# Patient Record
Sex: FEMALE | Race: OTHER | Employment: UNEMPLOYED
[De-identification: names, ages, dates, MRNs, and addresses within clinical notes are randomized per-mention and may not be internally consistent; named-entity substitution may affect disease eponyms.]

---

## 2022-05-26 ENCOUNTER — OFFICE VISIT (OUTPATIENT)
Dept: FAMILY MEDICINE CLINIC | Facility: CLINIC | Age: 62
End: 2022-05-26

## 2022-05-26 VITALS
TEMPERATURE: 98 F | WEIGHT: 174.2 LBS | RESPIRATION RATE: 18 BRPM | BODY MASS INDEX: 30.87 KG/M2 | OXYGEN SATURATION: 96 % | DIASTOLIC BLOOD PRESSURE: 92 MMHG | HEART RATE: 72 BPM | SYSTOLIC BLOOD PRESSURE: 166 MMHG | HEIGHT: 63 IN

## 2022-05-26 DIAGNOSIS — R22.1 NECK SWELLING: ICD-10-CM

## 2022-05-26 DIAGNOSIS — Z98.890 HISTORY OF THYROID SURGERY: ICD-10-CM

## 2022-05-26 DIAGNOSIS — Z76.89 ENCOUNTER TO ESTABLISH CARE: ICD-10-CM

## 2022-05-26 DIAGNOSIS — E03.9 ACQUIRED HYPOTHYROIDISM: ICD-10-CM

## 2022-05-26 DIAGNOSIS — E11.8 DIABETES MELLITUS TYPE 2 WITH COMPLICATIONS (HCC): ICD-10-CM

## 2022-05-26 DIAGNOSIS — M95.3 NECK DEFORMITY, ACQUIRED: ICD-10-CM

## 2022-05-26 DIAGNOSIS — I10 PRIMARY HYPERTENSION: Primary | ICD-10-CM

## 2022-05-26 DIAGNOSIS — E78.2 MIXED HYPERLIPIDEMIA: ICD-10-CM

## 2022-05-26 PROCEDURE — 99204 OFFICE O/P NEW MOD 45 MIN: CPT | Performed by: NURSE PRACTITIONER

## 2022-05-26 RX ORDER — LEVOTHYROXINE SODIUM 0.1 MG/1
100 TABLET ORAL DAILY
COMMUNITY
End: 2022-08-10 | Stop reason: SDUPTHER

## 2022-05-26 RX ORDER — ATENOLOL 100 MG/1
100 TABLET ORAL DAILY
COMMUNITY
End: 2022-08-15

## 2022-05-26 ASSESSMENT — PATIENT HEALTH QUESTIONNAIRE - PHQ9
SUM OF ALL RESPONSES TO PHQ QUESTIONS 1-9: 0
1. LITTLE INTEREST OR PLEASURE IN DOING THINGS: 0
2. FEELING DOWN, DEPRESSED OR HOPELESS: 0
SUM OF ALL RESPONSES TO PHQ QUESTIONS 1-9: 0
SUM OF ALL RESPONSES TO PHQ9 QUESTIONS 1 & 2: 0
SUM OF ALL RESPONSES TO PHQ QUESTIONS 1-9: 0
SUM OF ALL RESPONSES TO PHQ QUESTIONS 1-9: 0

## 2022-05-26 ASSESSMENT — ENCOUNTER SYMPTOMS
ABDOMINAL DISTENTION: 0
WHEEZING: 0
DIARRHEA: 0
SORE THROAT: 0
COUGH: 0
SINUS PAIN: 0
COLOR CHANGE: 0
SINUS PRESSURE: 0
EYE PAIN: 0
VOMITING: 0
CONSTIPATION: 0
SHORTNESS OF BREATH: 0
NAUSEA: 0
ABDOMINAL PAIN: 0
CHEST TIGHTNESS: 0
BLOOD IN STOOL: 0

## 2022-05-26 NOTE — PATIENT INSTRUCTIONS
Patient Education        Aprenda acerca de la planificación de comidas para la diabetes  Learning About Meal Planning for Diabetes  ¿Por qué planificar las comidas? La planificación de comidas puede ser Constance Diamond parte crucial del manejo de la diabetes. Planificar las comidas y los refrigerios con el equilibrio correcto de carbohidratos, proteínas y grasas puede ayudarle a mantener los niveles de azúcar en la vianney dentro de los límites ideales que estableció junto con sumédico.  No tiene que comer alimentos especiales. Puede comer lo mismo que atkins susy, incluso dulces de vez en cuando. Beto debe prestar atención a la cantidad y lafrecuencia con que come ciertos alimentos. Nomi vez desee colaborar con un dietista o un educador en diabetes certificado. Él o [place] puede darle consejos y sugerencias de comidas y puede responder a duarte preguntas sobre la planificación de comidas. Elisabet profesional sanitario tambiénpuede ayudarle a alcanzar un peso saludable si deo es daniela de duarte objetivos. ¿Qué plan es adecuado para usted? Atkins dietista o educador en diabetes puede sugerirle que empiece con el formatode plato o el recuento de carbohidratos. Formato de plato  El formato de plato es ching manera sencilla de ayudarle a controlar la manera en que se [place]. Usted planifica duarte comidas aprendiendo a bob la cantidad de espacio que cada alimento debería ocupar en un plato. Usar el formato de plato le ayuda a distribuir los carbohidratos eliz el día. Puede hacer que le resulte más fácil mantener el nivel de azúcar en la vianney dentro de los límites ideales. También le ayuda a bob si está comiendo porciones de un tamañosaludable. Para usar el formato de plato, llene la mitad del plato con verduras sin almidón. Añada carne o sustitutos de carne en un cuarto del plato. Ponga ching verdura con almidón o un grano (jorge arroz integral o ching papa) en el último cuarto del plato.  Puede agregar Constance Diamond pequeña pieza de fruta y algo de Luis Domin[place] o yogur descremado o semidescremado, dependiendo de atkins meta de carbohidratos paracada comida. Estos son algunos consejos para usar el formato de plato:   Asegúrese de no estar usando un plato demasiado thea. Lo mejor es usar un plato de 9 pulgadas (23 cm). Muchos restaurantes usan platos más grandes.  Acostúmbrese a usar el formato de plato en casa. De deo modo puede luego usarlo cuando coma afuera.  Anote las preguntas que tenga acerca de usar el formato de [place]. Hable con atkins médico, dietista o educador en diabetes sobre duarte inquietudes. Recuento de carbohidratos  Con el recuento de carbohidratos, usted planifica las comidas de acuerdo a la cantidad de carbohidratos en cada alimento. Los carbohidratos aumentan el nivel de azúcar en la [place] y con mayor rapidez que otros nutrientes. Se encuentran en postres, panes y cereales y en la fruta. También se encuentran en las verduras con almidón, tales jorge las ojselito y [place], los granos jorge el arroz y la pasta, así jorge en la Luis Dominion y el yogur. Distribuir el consumo de carbohidratos a lo boone del día le ayuda a mantener el azúcar en la vianney enlos límites ideales. Atkins cantidad diaria depende de varios factores, incluyendo atkins peso, nivel de [place], los [place] dewayne para la diabetes y los objetivos que tenga para duarte niveles de azúcar en la vianney. Un dietista registrado o un educador en diabetes puede ayudarle a planear cuántos carbohidratos incluir en cadacomida y refrigerio. Sherryle Greenhouse guía para la cantidad diaria de carbohidratos es:   Entre 39 y 61 gramos en cada comida. Eso equivale a 3 o 4 porciones de carbohidratos, aproximadamente.  Entre 15 y 21 gramos para cada refrigerio. Eso equivale a 1 porción de carbohidratos, aproximadamente. La etiqueta nutricional de los alimentos envasados le indica la cantidad de carbohidratos que hay en ching porción de deo alimento. Ge, ladonna cuál es el tamaño de la porción que indica la [place]. ¿Es risa porción la cantidad que usted come de ching vez? Toda la información nutricional en ching etiqueta se basa en el tamaño de la porción. Así que si usted come [facility] mayor o panchito cantidad, tendrá [employer] cifras. La cantidad total de carbohidratos es lo siguiente que debe buscar en la etiqueta. Si cuenta las porciones decarbohidratos, ching porción de carbohidratos equivale a 15 gramos. Para los alimentos que no tienen etiquetas, jorge las frutas y las verduras frescas, usted necesitará ching guía que enumere la cantidad de carbohidratos que contienen esos alimentos. Pregúntele a atkins médico, dietista o educador endiabetes acerca de libros o guías de nutrición que [employer]. Si [place] insulina, necesita saber cuántos gramos de carbohidratos hay en ching comida. [place] le permite saber cuánta insulina de acción rápida necesita antes de comer. Si [place] ching bomba de [place], yash recibe [facility] cantidad luz de insulina a lo boone del día. Por lo tanto, debe programar la bomba en las comidas para que le suministre insulina adicional a fin de cubrir el aumentodel azúcar en la vianney después de las comidas. Cuando usted sabe qué cantidad de carbohidratos consumirá, puede programar la cantidad correcta de [place]. O si [place] siempre la misma dosis de insulina, tendrá que asegurarse de consumir la misma cantidad de carbohidratos en cadacomida. Si necesita ayuda adicional para comprender el recuento de carbohidratos y lasetiquetas de nutrición, pregúntele a atkins médico, dietista o educador en diabetes. ¿Cómo puede empezar a usar la planificación de comidas? Estos son algunos consejos para empezar:   Planifique las comidas para toda la semana por anticipado. No se olvide de incluir los refrigerios.  Use libros de cocina o recetas en Internet para planificar varias comidas principales. Planifique algunas comidas rápidas para las noches en las que esté ocupado.  También puede cocinar el doble de algunas comidas que se puedan congelar. Puede guardar la mitad para otras noches en las que esté ocupado y no tenga tiempo para cocinar.  Asegúrese de que tenga los ingredientes que necesita para duarte recetas. Si tiene poca cantidad de algunos artículos básicos, añádalos a la lista de la compra.  Kate ching lista de alimentos que utiliza para preparar desayunos, almuerzos y refrigerios. Anote cantidades abundantes de frutas y verduras.  Coloque esta lista en la luis del refrigerador. Siga añadiendo cosas según se le [place] Samir ocurriendo.  Lleve la lista consigo cuando vaya a hacer las compras semanales. La atención de seguimiento es ching parte clave de atkins tratamiento y seguridad. Asegúrese de hacer y acudir a todas las citas, y llame a atkins médico si está teniendo problemas. También es ching buena idea saber los [place] de susexámenes y mantener ching lista de los medicamentos que dewayne. ¿Dónde puede encontrar más información en inglés? Shazia [place] a [email or web address] e ingrese a atkins cuenta de MyChart. Sharmaine Mccormick Z057 en el Verlan Render \"Search Health Information\" para más información (en inglés) sobre \"Aprenda acerca de la planificación de comidas para la diabetes. \"     Si no tiene ching cuenta, kate yovani en el enlace \"Sign Up Now\". Revisado: 28 julio, 2021               Versión del contenido: 13.2  © 7526-4404 Healthwise, Incorporated. Las instrucciones de cuidado fueron adaptadas bajo licencia por [facility] ([facility]). Si usted tiene [place] afección médica o sobre estas instrucciones, siempre pregunte a atkins profesional de tra. [facility] niega toda garantía o responsabilidad por atkins uso de esta información. Patient Education        Medicamentos no insulínicos para la diabetes de tipo 2: Instrucciones de cuidado  Noninsulin Medicines for Type 2 Diabetes: Care Instructions  Generalidades     Existen diferentes tipos de medicamentos no insulínicos para la diabetes. Cada tipo funciona de un modo distinto. Beto todos ellos le ayudan a controlar el azúcar en la vianney. Algunos tipos ayudan a que el organismo produzca insulina para reducir el azúcar en la vianney. Otros reducen la cantidad de insulina que necesita el organismo. Algunos pueden retrasar la velocidad con la que el cuerpo digiere los azúcares. Y algunos pueden eliminar el exceso de glucosa através de la [place]. Es posible que deba camryn más de un medicamento para la diabetes. Kiya Prior medicamentos podrían dora mejores resultados para reducir el nivel de azúcar enla vianney que daniela solo.  Metformina. Esta reduce la cantidad de glucosa que produce el hígado. Y le ayuda a reaccionar mejor a la insulina. También disminuye la cantidad de azúcar almacenada que libera el hígado cuando usted no está comiendo.  Sulfonilureas. Estas ayudan al organismo a liberar más insulina. Algunas actúan eliz muchas horas. Pueden causar niveles bajos de azúcar en la vianney si no come según lo planeó. Un ejemplo es la glipizida.  Tiazolidinedionas. Estas reducen la cantidad de glucosa en la vianney. También le ayudan a reaccionar mejor a la insulina. Un ejemplo es la pioglitazona.  Inhibidores de SGLT2. Estos ayudan a eliminar el exceso de glucosa a través de la [place]. También pueden ayudar a algunas personas a adelgazar. Un ejemplo es la ertugliflozina.  Inhibidores de DPP-4. Estos ayudan al organismo a aumentar el nivel de insulina después de comer. También ayudan al organismo a producir panchito cantidad de ching hormona que aumenta el azúcar en la [place]. Un ejemplo es la alogliptina.  Hormonas incretinas (agonistas del receptor GLP-1). Estas ayudan al organismo a producir ching proteína que puede elevar el nivel de insulina y hacer que tenga menos [place]. Se administran en forma de inyección o pastilla. Un ejemplo es la semaglutida.  Meglitinidas. Estas ayudan al organismo a liberar insulina. También ayudan a retardar la manera en que el organismo digiere los azúcares. Por lo tanto, pueden evitar que el azúcar en la vianney se eleve demasiado rápido después de comer.  Inhibidores de la bonita-glucosidasa. Estos evitan la descomposición de los almidones. [place] significa que reducen la cantidad de glucosa que se absorbe cuando usted come. No ayudan a que atkins organismo produzca más insulina. Por lo tanto, no causarán niveles bajos de azúcar en la vianney a menos que los use junto con otros medicamentos para la diabetes. La atención de seguimiento es ching parte clave de atkins tratamiento y seguridad. Asegúrese de hacer y acudir a todas las citas, y llame a atkins médico si está teniendo problemas. También es ching buena idea saber los [place] de susexámenes y mantener ching lista de los medicamentos que dewayne. ¿Cómo puede cuidarse en el hogar?  Siga ching dieta saludable. Luiza algo de ejercicio todos los vilma. [place] puede ayudarle a reducir la cantidad de medicamentos que necesita.  No tome otros medicamentos recetados o de venta chirag, vitaminas, productos herbarios o suplementos sin consultar young a atkins médico. Algunos medicamentos para la diabetes de tipo 2 pueden causar problemas con otros medicamentos o suplementos.  Dígale a atkins médico si tiene planes de quedar embarazada. Algunos de [employer] no son seguros para las [address].  Sea onesimo con los medicamentos. [employer] medicamentos exactamente jorge le fueron recetados. Las meglitinidas y las sulfonilureas pueden hacer que el azúcar en la vianney baje [place]. Llame a atkins médico si stanislav estar teniendo un problema con atkins medicamento.  Revísese los niveles de azúcar en la vianney con frecuencia. Puede usar un monitor de glucosa. Llevar un registro puede ayudarle a saber cómo ciertos alimentos, actividades y medicamentos afectan atkins azúcar en la vianney. Y puede ayudarle a prevenir que el azúcar en la vianney baje a niveles peligrosos. ¿Cuándo debe pedir ayuda?    Llame al 911 en cualquier momento que considere que necesita atención de [place]. Por ejemplo, llame si:     Se desmayó (perdió el conocimiento).      Está confuso o no puede pensar con claridad.      Hernandez nivel de azúcar en la vianney es muy alto o [place] sivakumar. Preste especial atención a los cambios en hernandez tra y asegúrese de comunicarsecon hernandez médico si:     Hernandez nivel de azúcar en la vianney permanece fuera de los límites ideales que el médico monosn establecido para usted.      Tiene cualquier problema. ¿Dónde puede encontrar más información en inglés? Scott Isaacs a [email or web address] e ingrese a hernandez cuenta de MyChart. Sangita Guzman H153 en el cuadro \"Search Health Information\" para más información (en inglés) sobre \"Medicamentos no insulínicos para la diabetes de tipo 2: Instrucciones de cuidado. \"     Si no tiene ching cuenta, kate yovani en el enlace \"Sign Up Now\". Revisado: 28 julio, 2021               Versión del contenido: 13.2  © 5505-9167 Healthwise, Incorporated. Las instrucciones de cuidado fueron adaptadas bajo licencia por [facility] ([facility]). Si usted tiene [place] afección médica o sobre estas instrucciones, siempre pregunte a hernandez profesional de tra. Healthwise, Incorporated niega toda garantía o responsabilidad por hernandez uso de esta información. Patient Education        Aprenda sobre el recuento de carbohidratos y comer afuera cuando tiene diabetes  Learning About Carbohydrate (Carb) Counting and Eating Out When You Have Diabetes  ¿Por qué planificar las comidas? La planificación de comidas puede ser Jamse Jayesh parte crucial del manejo de la diabetes. Planificar las comidas y los refrigerios con el equilibrio correcto de carbohidratos, proteínas y grasas puede ayudarle a mantener los niveles de azúcar en la vianney dentro de los límites ideales que estableció junto con sumédico.  No tiene que comer alimentos especiales. Puede comer lo mismo que hernandez susy, incluso dulces de vez en cuando.  Beto debe prestar atención a la cantidad y lafrecuencia con que come ciertos alimentos. Nomi vez desee colaborar con un dietista o un educador en diabetes certificado. Él o [place] puede darle consejos y sugerencias de comidas y puede responder a duarte preguntas sobre la planificación de comidas. Elisabet profesional sanitario tambiénpuede ayudarle a alcanzar un peso saludable si deo es daniela de duarte objetivos. ¿Qué debería saber acerca de ingerir carbohidratos? Manejar la cantidad de carbohidratos que ingiere es yi parte importante de la alimentación saludable cuando tiene diabetes. Los carbohidratos se encuentranen muchos alimentos.  Sepa qué alimentos contienen carbohidratos. Y aprenda qué cantidad de carbohidratos contienen los diferentes alimentos. ? El pan, los cereales, la pasta y el arroz tienen aproximadamente 15 gramos de carbohidratos por porción. Yi porción equivale a 1 rebanada de pan (1 onza o 28 g), ½ taza de cereal cocido o 1/3 de taza de pasta o arroz cocidos. ? Las frutas tienen 15 gramos de carbohidratos por porción. Gaby Abraham porción es 1 fruta fresca pequeña, jorge yi [place] mau o yi naranja; ½ banana (plátano); ½ taza de fruta cocida o enlatada; ½ taza de jugo de fruta; 1 taza de melón o frambuesas; o 2 cucharadas de frutas secas. ? Madsen Gear y el yogur sin azúcar agregado tienen 15 gramos de carbohidratos por porción. Gaby Abraham porción es 1 taza de Mary D o 2/3 taza de yogur sin azúcar agregado. ? Las verduras con almidón tienen 15 gramos de carbohidratos por porción. Yi porción es ½ taza de puré de papa o camote (batata, boniato); 1 taza de calabacín; ½ papa horneada pequeña; ½ taza de frijoles cocidos; o ½ taza de maíz (elote) o arvejas (chícharos) cocidos.  Aprenda cuántos carbohidratos debe consumir cada día y en cada comida. Un dietista o CDE le puede enseñar cómo llevar la cuenta de los carbohidratos que consume. A esto se le llama recuento de carbohidratos.    Si no está seguro de cómo [employer] de carbohidratos, [employer] [place] para planificar las comidas. Es ching Cornelius Ream y rápida de asegurarse de que consuma comidas equilibradas. También le ayuda a distribuir los carbohidratos eliz el día. ? Divida el plato por tipo de alimento. Llene medio plato con verduras sin almidón, ponga carne u otras proteínas en ching cuarta parte del plato y granos o verduras con almidón en el último cuarto del plato. A esto puede agregarle un pequeño pedazo de fruta y 3 taza de [place] o yogur, según la cantidad de carbohidratos que deba consumir en ching comida.  Trate de comer aproximadamente la misma cantidad de carbohidratos en cada comida. No \"reserve\" atkins cantidad diaria de carbohidratos para consumirlos en ching catherine comida.  Las proteínas contienen muy pocos o nada de carbohidratos por porción. Los ejemplos de proteínas incluyen carne de res, Lynda heights, [place], [place], [place], tofu, [place]-barre, requesón (\"cottage cheese\") y la mantequilla de cacahuate [place]). Ching porción de carne son 3 onzas (85 g), lo cual es aproximadamente del tamaño de ching baraja de naipes. Los ejemplos de porciones de sustitutos de la carne (equivalente a 1 onza o 28 g de carne) son 1/4 de taza de requesón, 1 huevo, 1 cucharada de [place] de cacahuate y ½ taza de tofu. ¿Cómo puede comer fuera y aún así comer de modo saludable?  Aprenda a calcular los tamaños de las porciones de alimentos que contienen carbohidratos. Si mide la comida en casa, será más fácil calcular la cantidad en ching porción de comida de restaurante.  Si el platillo que pide contiene demasiados carbohidratos (jorge joselito, maíz o frijoles al horno), pida un alimento bajo en carbohidratos en aktins lugar. Pida ching ensalada o verduras.  Si [place] insulina, revise atkins azúcar en la vianney antes y después de comer fuera para ayudarle a planear cuánto comer en el futuro.  Si usted come [employer] carbohidratos de lo planeado en ching comida, dé un paseo o kate otro tipo de ejercicio.  [place] ayudará a reducir el azúcar en la vianney. ¿Cuáles son algunos consejos para comer shari?  Limite las grasas saturadas, jorge la grasa de la carne y productos lácteos. Esta es ching opción saludable, porque las personas que tienen diabetes tienen un mayor riesgo de enfermedades del corazón. Así que elija garay magros de carne y productos lácteos descremados o semidescremados. Utilice aceite de ledbetter o de canola en lugar de mantequilla o manteca al cocinar.  No se salte comidas. Hernandez nivel de azúcar en la vianney puede bajar demasiado si usted se salta comidas y dewayne insulina o ciertos medicamentos para la diabetes.  Consulte con hernandez médico antes de beber alcohol. El alcohol puede hacer que hernandez azúcar en la vianney baje demasiado. El alcohol también puede causar ching reacción adversa si usted dewayne ciertos medicamentos para la diabetes. La atención de seguimiento es ching parte clave de hernandez tratamiento y seguridad. Asegúrese de hacer y acudir a todas las citas, y llame a hernandez médico si está teniendo problemas. También es ching buena idea saber los [place] de susexámenes y mantener ching lista de los medicamentos que dewayne. ¿Dónde puede encontrar más información en inglés? Patricio Grecia a [email or web address] e ingrese a hernandez cuenta de MyChart. Kaila Signs M362 en el Jeannie [place] \"Search Health Information\" para más información (en inglés) sobre \"Aprenda sobre el recuento de carbohidratos y comer afuera cuando tiene diabetes. \"     Si no tiene ching cuenta, kate yovani en el enlace \"Sign Up Now\". Revisado: 28 julio, 2021               Versión del contenido: 13.2  © 9885-5656 Healthwise, Incorporated. Las instrucciones de cuidado fueron adaptadas bajo licencia por [facility] ([facility]). Si usted tiene [place] afección médica o sobre estas instrucciones, siempre pregunte a hernandez profesional de tra. Healthwise, Incorporated niega toda garantía o responsabilidad por hernandez uso de esta información.

## 2022-05-26 NOTE — PROGRESS NOTES
[address] (:  1960) is a 63 y.o. female,New patient, here for evaluation of the following chief complaint(s):  New Patient (pt is here to estab PCP care. pt is fasting today) and Medication Refill         ASSESSMENT/PLAN:  1. Primary hypertension  Assessment & Plan:   Unclear control, continue current plan pending work up below, medication adherence emphasized and lifestyle modifications recommended     Monitor BP closely. F/u in 2 weeks to continue monitoring. Labs today   TSH today. May be contributing to elevated pressures  Orders:  -     CBC with Auto Differential; Future  -     Comprehensive Metabolic Panel; Future  2. Mixed hyperlipidemia  Assessment & Plan:   Unclear control, continue current plan pending work up below, medication adherence emphasized and lifestyle modifications recommended  Orders:  -     Lipid Panel; Future  3. Diabetes mellitus type 2 with complications [facility])  Assessment & Plan:   Unclear control, continue current plan pending work up below, medication adherence emphasized and lifestyle modifications recommended     Untreated dm2. Will discuss treatment in 2 weeks  Orders:  -     Hemoglobin A1C; Future  4. Acquired hypothyroidism  Assessment & Plan:   Unclear control, continue current plan pending work up below, medication adherence emphasized and lifestyle modifications recommended  Orders:  -     TSH; Future  5. Neck swelling  Comments:  right side  Orders:  -     US HEAD NECK SOFT TISSUE THYROID; Future  6. Neck deformity, acquired  Comments:  right side  Orders:  -     US HEAD NECK SOFT TISSUE THYROID; Future  7. History of thyroid surgery  -     US HEAD NECK SOFT TISSUE THYROID; Future  8. Encounter to establish care      Return in about 2 weeks (around 2022). Subjective   SUBJECTIVE/OBJECTIVE:  Mrs. Jass Prieto presents today to establish care. PMH includes surgical hypothyroidism (). Htn and DM2.  She reports she has never taken any medication for diabetes. BP elevated today and she is taking her BP medication now in the office. I will monitor her closely and she will return in 2 weeks for lab results and to continued care plan. Right sided neck swelling and discomfort. US of the neck ordered. I will call with results. Denies sob, chest pain, palpitations or fever. Review of Systems   Constitutional: Negative for activity change, appetite change, chills, diaphoresis, fatigue and fever. HENT: Negative for congestion, ear pain, sinus pressure, sinus pain and sore throat. Eyes: Negative for pain and visual disturbance. Respiratory: Negative for cough, chest tightness, shortness of breath and wheezing. Cardiovascular: Negative for chest pain, palpitations and leg swelling. Gastrointestinal: Negative for abdominal distention, abdominal pain, blood in stool, constipation, diarrhea, nausea and vomiting. Endocrine: Negative for polydipsia, polyphagia and polyuria. Genitourinary: Negative for dysuria, flank pain, hematuria and urgency. Musculoskeletal: Positive for neck pain. Negative for arthralgias. Skin: Negative for color change and rash. Neurological: Negative for dizziness, tremors, weakness and headaches. Psychiatric/Behavioral: Negative for agitation, behavioral problems, dysphoric mood and suicidal ideas. The patient is not nervous/anxious. Objective   Physical Exam  Vitals and nursing note reviewed. Constitutional:       Appearance: Normal appearance. HENT:      Head: Normocephalic and atraumatic. Nose: Nose normal.      Mouth/Throat:      Mouth: Mucous membranes are moist.      Pharynx: Oropharynx is clear. Eyes:      Extraocular Movements: Extraocular movements intact. Conjunctiva/sclera: Conjunctivae normal.      Pupils: Pupils are equal, round, and reactive to light. Neck:     Cardiovascular:      Rate and Rhythm: Normal rate and regular rhythm. Pulses: Normal pulses.       Heart sounds: Normal heart sounds. Pulmonary:      Effort: Pulmonary effort is normal.      Breath sounds: Normal breath sounds. Abdominal:      General: Abdomen is flat. Bowel sounds are normal.      Palpations: Abdomen is soft. Musculoskeletal:         General: Normal range of motion. Cervical back: Neck supple. Edema present. Pain with movement and muscular tenderness present. Skin:     General: Skin is warm and dry. Capillary Refill: Capillary refill takes less than 2 seconds. Neurological:      General: No focal deficit present. Mental Status: She is alert and oriented to person, place, and time. Mental status is at baseline. Psychiatric:         Mood and Affect: Mood normal.         Behavior: Behavior normal.         Thought Content: Thought content normal.         Judgment: Judgment normal.            On this date 5/26/2022 I have spent 30 minutes reviewing previous notes, test results and face to face with the patient discussing the diagnosis and importance of compliance with the treatment plan as well as documenting on the day of the visit. An electronic signature was used to authenticate this note.     --Lindy Jauregui, APRN - CNP

## 2022-05-26 NOTE — ASSESSMENT & PLAN NOTE
Unclear control, continue current plan pending work up below, medication adherence emphasized and lifestyle modifications recommended     Untreated dm2.  Will discuss treatment in 2 weeks

## 2022-05-26 NOTE — ACP (ADVANCE CARE PLANNING)
Advance Care Planning   Advance Care Planning (ACP)     Attempted to discuss ACP with Ms. Alejandro Whitfield today, she declines to discuss at this time. Will readdress at future visit.

## 2022-05-26 NOTE — ASSESSMENT & PLAN NOTE
Unclear control, continue current plan pending work up below, medication adherence emphasized and lifestyle modifications recommended     Monitor BP closely. F/u in 2 weeks to continue monitoring. Labs today   TSH today.  May be contributing to elevated pressures

## 2022-05-30 LAB
ALBUMIN SERPL-MCNC: 3.9 G/DL (ref 3.5–5)
ALBUMIN/GLOB SERPL: 0.9 {RATIO} (ref 1.2–3.5)
ALP SERPL-CCNC: 110 U/L (ref 50–136)
ALT SERPL-CCNC: 56 U/L (ref 12–65)
ANION GAP SERPL CALC-SCNC: 6 MMOL/L (ref 7–16)
AST SERPL-CCNC: 29 U/L (ref 15–37)
BASOPHILS # BLD: 0.1 K/UL (ref 0–0.2)
BASOPHILS NFR BLD: 1 % (ref 0–2)
BILIRUB SERPL-MCNC: 0.3 MG/DL (ref 0.2–1.1)
BUN SERPL-MCNC: 17 MG/DL (ref 6–23)
CALCIUM SERPL-MCNC: 9.1 MG/DL (ref 8.3–10.4)
CHLORIDE SERPL-SCNC: 106 MMOL/L (ref 98–107)
CHOLEST SERPL-MCNC: 210 MG/DL
CO2 SERPL-SCNC: 26 MMOL/L (ref 21–32)
CREAT SERPL-MCNC: 0.8 MG/DL (ref 0.6–1)
DIFFERENTIAL METHOD BLD: ABNORMAL
EOSINOPHIL # BLD: 0.1 K/UL (ref 0–0.8)
EOSINOPHIL NFR BLD: 2 % (ref 0.5–7.8)
ERYTHROCYTE [DISTWIDTH] IN BLOOD BY AUTOMATED COUNT: 14.2 % (ref 11.9–14.6)
GLOBULIN SER CALC-MCNC: 4.3 G/DL (ref 2.3–3.5)
GLUCOSE SERPL-MCNC: 90 MG/DL (ref 65–100)
HCT VFR BLD AUTO: 43.3 % (ref 35.8–46.3)
HDLC SERPL-MCNC: 45 MG/DL (ref 40–60)
HDLC SERPL: 4.7 {RATIO}
HGB BLD-MCNC: 13.5 G/DL (ref 11.7–15.4)
IMM GRANULOCYTES # BLD AUTO: 0 K/UL (ref 0–0.5)
IMM GRANULOCYTES NFR BLD AUTO: 0 % (ref 0–5)
LDLC SERPL CALC-MCNC: 143.4 MG/DL
LYMPHOCYTES # BLD: 2.1 K/UL (ref 0.5–4.6)
LYMPHOCYTES NFR BLD: 27 % (ref 13–44)
MCH RBC QN AUTO: 30 PG (ref 26.1–32.9)
MCHC RBC AUTO-ENTMCNC: 31.2 G/DL (ref 31.4–35)
MCV RBC AUTO: 96.2 FL (ref 79.6–97.8)
MONOCYTES # BLD: 0.5 K/UL (ref 0.1–1.3)
MONOCYTES NFR BLD: 7 % (ref 4–12)
NEUTS SEG # BLD: 4.9 K/UL (ref 1.7–8.2)
NEUTS SEG NFR BLD: 63 % (ref 43–78)
NRBC # BLD: 0 K/UL (ref 0–0.2)
PLATELET # BLD AUTO: 343 K/UL (ref 150–450)
PMV BLD AUTO: 10 FL (ref 9.4–12.3)
POTASSIUM SERPL-SCNC: 4.3 MMOL/L (ref 3.5–5.1)
PROT SERPL-MCNC: 8.2 G/DL (ref 6.3–8.2)
RBC # BLD AUTO: 4.5 M/UL (ref 4.05–5.2)
SODIUM SERPL-SCNC: 138 MMOL/L (ref 136–145)
TRIGL SERPL-MCNC: 108 MG/DL (ref 35–150)
TSH, 3RD GENERATION: 3.49 UIU/ML (ref 0.36–3.74)
VLDLC SERPL CALC-MCNC: 21.6 MG/DL (ref 6–23)
WBC # BLD AUTO: 7.7 K/UL (ref 4.3–11.1)

## 2022-06-09 ENCOUNTER — OFFICE VISIT (OUTPATIENT)
Dept: PRIMARY CARE CLINIC | Facility: CLINIC | Age: 62
End: 2022-06-09

## 2022-06-09 VITALS
HEART RATE: 79 BPM | BODY MASS INDEX: 30.9 KG/M2 | TEMPERATURE: 98 F | OXYGEN SATURATION: 99 % | DIASTOLIC BLOOD PRESSURE: 90 MMHG | HEIGHT: 63 IN | WEIGHT: 174.4 LBS | SYSTOLIC BLOOD PRESSURE: 160 MMHG | RESPIRATION RATE: 16 BRPM

## 2022-06-09 DIAGNOSIS — Z86.39 HISTORY OF HYPERCHOLESTEROLEMIA: ICD-10-CM

## 2022-06-09 DIAGNOSIS — H66.003 NON-RECURRENT ACUTE SUPPURATIVE OTITIS MEDIA OF BOTH EARS WITHOUT SPONTANEOUS RUPTURE OF TYMPANIC MEMBRANES: ICD-10-CM

## 2022-06-09 DIAGNOSIS — R51.9 ACUTE NONINTRACTABLE HEADACHE, UNSPECIFIED HEADACHE TYPE: ICD-10-CM

## 2022-06-09 DIAGNOSIS — Z76.89 ENCOUNTER TO ESTABLISH CARE: Primary | ICD-10-CM

## 2022-06-09 DIAGNOSIS — R60.0 LOCALIZED EDEMA: ICD-10-CM

## 2022-06-09 DIAGNOSIS — Z87.898 HISTORY OF TACHYCARDIA: ICD-10-CM

## 2022-06-09 DIAGNOSIS — M25.511 ACUTE PAIN OF RIGHT SHOULDER: ICD-10-CM

## 2022-06-09 DIAGNOSIS — E89.0 H/O TOTAL THYROIDECTOMY: ICD-10-CM

## 2022-06-09 DIAGNOSIS — I10 HYPERTENSION, UNSPECIFIED TYPE: ICD-10-CM

## 2022-06-09 PROBLEM — Z98.890 H/O TOTAL THYROIDECTOMY: Status: ACTIVE | Noted: 2022-06-09

## 2022-06-09 PROBLEM — Z90.89 H/O TOTAL THYROIDECTOMY: Status: ACTIVE | Noted: 2022-06-09

## 2022-06-09 PROCEDURE — 99204 OFFICE O/P NEW MOD 45 MIN: CPT | Performed by: NURSE PRACTITIONER

## 2022-06-09 RX ORDER — LEVOTHYROXINE SODIUM 0.1 MG/1
100 TABLET ORAL DAILY
COMMUNITY
End: 2022-06-09 | Stop reason: SDUPTHER

## 2022-06-09 RX ORDER — ATENOLOL 100 MG/1
100 TABLET ORAL DAILY
Qty: 30 TABLET | Refills: 0 | Status: SHIPPED | OUTPATIENT
Start: 2022-06-09 | End: 2022-08-15 | Stop reason: SDUPTHER

## 2022-06-09 RX ORDER — ATENOLOL 100 MG/1
100 TABLET ORAL DAILY
COMMUNITY
End: 2022-06-09 | Stop reason: SDUPTHER

## 2022-06-09 RX ORDER — LEVOTHYROXINE SODIUM 0.1 MG/1
100 TABLET ORAL DAILY
Qty: 30 TABLET | Refills: 0 | Status: SHIPPED | OUTPATIENT
Start: 2022-06-09 | End: 2022-07-11 | Stop reason: SDUPTHER

## 2022-06-09 RX ORDER — AMOXICILLIN AND CLAVULANATE POTASSIUM 875; 125 MG/1; MG/1
1 TABLET, FILM COATED ORAL 2 TIMES DAILY
Qty: 14 TABLET | Refills: 0 | Status: SHIPPED | OUTPATIENT
Start: 2022-06-09 | End: 2022-06-16

## 2022-06-09 SDOH — ECONOMIC STABILITY: FOOD INSECURITY: WITHIN THE PAST 12 MONTHS, THE FOOD YOU BOUGHT JUST DIDN'T LAST AND YOU DIDN'T HAVE MONEY TO GET MORE.: NEVER TRUE

## 2022-06-09 SDOH — ECONOMIC STABILITY: HOUSING INSECURITY
IN THE LAST 12 MONTHS, WAS THERE A TIME WHEN YOU DID NOT HAVE A STEADY PLACE TO SLEEP OR SLEPT IN A SHELTER (INCLUDING NOW)?: NO

## 2022-06-09 SDOH — ECONOMIC STABILITY: FOOD INSECURITY: WITHIN THE PAST 12 MONTHS, YOU WORRIED THAT YOUR FOOD WOULD RUN OUT BEFORE YOU GOT MONEY TO BUY MORE.: NEVER TRUE

## 2022-06-09 SDOH — ECONOMIC STABILITY: INCOME INSECURITY: IN THE LAST 12 MONTHS, WAS THERE A TIME WHEN YOU WERE NOT ABLE TO PAY THE MORTGAGE OR RENT ON TIME?: NO

## 2022-06-09 ASSESSMENT — ENCOUNTER SYMPTOMS
SHORTNESS OF BREATH: 0
SINUS PRESSURE: 0
SORE THROAT: 0
CHEST TIGHTNESS: 0
DIARRHEA: 0
SINUS PAIN: 0
VOMITING: 0
COUGH: 0
ABDOMINAL PAIN: 0
NAUSEA: 0

## 2022-06-09 ASSESSMENT — PATIENT HEALTH QUESTIONNAIRE - PHQ9
2. FEELING DOWN, DEPRESSED OR HOPELESS: 0
1. LITTLE INTEREST OR PLEASURE IN DOING THINGS: 0
SUM OF ALL RESPONSES TO PHQ QUESTIONS 1-9: 0
SUM OF ALL RESPONSES TO PHQ9 QUESTIONS 1 & 2: 0

## 2022-06-09 ASSESSMENT — SOCIAL DETERMINANTS OF HEALTH (SDOH)
HOW HARD IS IT FOR YOU TO PAY FOR THE VERY BASICS LIKE FOOD, HOUSING, MEDICAL CARE, AND HEATING?: NOT HARD AT ALL
HOW HARD IS IT FOR YOU TO PAY FOR THE VERY BASICS LIKE FOOD, HOUSING, MEDICAL CARE, AND HEATING?: NOT HARD AT ALL

## 2022-06-09 NOTE — PATIENT INSTRUCTIONS
Patient Education        Elevated Blood Pressure: Care Instructions  Your Care Instructions    Blood pressure is a measure of how hard the blood pushes against the walls of your arteries. It's normal for blood pressure to go up and down throughout the day. But if it stays up over time, you have high blood pressure. Two numbers tell you your blood pressure. The first number is the systolic pressure. It shows how hard the blood pushes when your heart is pumping. The second number is the diastolic pressure. It shows how hard the blood pushes between heartbeats, when your heart is relaxed and filling with blood. An ideal blood pressure in adults is less than 120/80 (say \"120 over 80\"). High blood pressure is 140/90 or higher. You have high blood pressure if your top number is 140 or higher or your bottom number is 90 or higher, or both. The main test for high blood pressure is simple, fast, and painless. To diagnose high blood pressure, your doctor will test your blood pressure at different times. After testing your blood pressure, your doctor may ask you to test it again when you are home. If you are diagnosed with high blood pressure, you can work with your doctor to make a long-term plan to manage it. Follow-up care is a key part of your treatment and safety. Be sure to make and go to all appointments, and call your doctor if you are having problems. It's also a good idea to know your test results and keep a list of the medicines you take. How can you care for yourself at home? · Do not smoke. Smoking increases your risk for heart attack and stroke. If you need help quitting, talk to your doctor about stop-smoking programs and medicines. These can increase your chances of quitting for good. · Stay at a healthy weight. · Try to limit how much sodium you eat to less than 2,300 milligrams (mg) a day. Your doctor may ask you to try to eat less than 1,500 mg a day. · Be physically active.  Get at least 30 minutes of exercise on most days of the week. Walking is a good choice. You also may want to do other activities, such as running, swimming, cycling, or playing tennis or team sports. · Avoid or limit alcohol. Talk to your doctor about whether you can drink any alcohol. · Eat plenty of fruits, vegetables, and low-fat dairy products. Eat less saturated and total fats. · Learn how to check your blood pressure at home. When should you call for help? Call your doctor now or seek immediate medical care if:    · Your blood pressure is much higher than normal (such as 180/110 or higher).     · You think high blood pressure is causing symptoms such as:  ¨ Severe headache. ¨ Blurry vision.    Watch closely for changes in your health, and be sure to contact your doctor if:    · You do not get better as expected. Where can you learn more? Go to https://[employer]. org and sign in to your [employer] account. Enter U342 in the Accurence box to learn more about \"Elevated Blood Pressure: Care Instructions. \"     If you do not have an account, please click on the \"Sign Up Now\" link. Current as of: May 10, 2017  Content Version: 11.6  © 2502-1839 [employer]. Care instructions adapted under license by [facility] ([facility]). If you have questions about a medical condition or this instruction, always ask your healthcare professional. [address] any warranty or liability for your use of this information.

## 2022-06-09 NOTE — PROGRESS NOTES
[facility] (: 1960) HPI     Chief Complaint   Patient presents with    Cholesterol Problem    Thyroid Problem        Reviewed and updated this visit by provider:  Tobacco  Allergies  Meds  Problems  Med Hx  Surg Hx  Fam Hx       Interpretation provided by Imelda Sanchez. Patient presents for hypercholesterinemia and hx of thyroid removal.   +HA, + fatigue, and pain on right arm. Symptoms began 1 wk ago w/ pain on right shoulder, left leg, right lower back and ears. Hx of sx on left shoulder had a cyst removed 18 yrs ago. Total thyroidectomy in . Was last seen on May 26th by a MD but unsure what clinic it was. Our office contacted Masoud Alfaro from [employer] and no records were found of medical care. Has daughter in [place] who is an RN that brings her medications, levothyroxine and atenolol, when she visits to the [place]. High BP  Was taking Enalapril previously but was giving pt a cough so she stopped taking. Has not taken other medication for BP besides atenolol. Yesterday AM BP was 197/102 right after taking atenolol. Midday 196/94. Today, this morning BP was 142/83 after taking Atenolol. Last pill of atenolol is tomorrow. Requesting refill. Does not take BP every day only when not feeling well. +HA and fatigue began yesterday. Denies chest pain, sob, palpations, acute visual changes and edema of extremities. Was told had high HR 18 yrs ago and given atenolol for the high heart rate. Pt is unsure what the arrhythmia was. Denies hx of imaging of heart, stress test. Denies structural abnormalities of heart. Immunizations:  Immunization status: up to date and documented. Review of Systems:   Review of Systems   Constitutional: Positive for chills (once yesterday) and fatigue. Negative for diaphoresis and fever. HENT: Positive for ear pain (right ear).  Negative for ear discharge, sinus pressure, sinus pain and sore throat. Eyes: Negative for visual disturbance. Respiratory: Negative for cough, chest tightness and shortness of breath. Cardiovascular: Negative for chest pain, palpitations and leg swelling. Gastrointestinal: Negative for abdominal pain, diarrhea, nausea and vomiting. Neurological: Positive for headaches. Negative for dizziness. BP (!) 160/90 (Site: Left Upper Arm, Position: Sitting)   Pulse 79   Temp 98 °F (36.7 °C)   Resp 16   Ht 5' 2.99\" (1.6 m)   Wt 174 lb 6.4 oz (79.1 kg)   SpO2 99%   BMI 30.90 kg/m²     Physical Examination: Physical Exam  Constitutional:       General: She is not in acute distress. Appearance: Normal appearance. She is obese. She is not ill-appearing, toxic-appearing or diaphoretic. HENT:      Head: Normocephalic and atraumatic. Right Ear: Ear canal and external ear normal. No mastoid tenderness. Tympanic membrane is erythematous. Left Ear: Tympanic membrane normal. Drainage (purulent ) present. No tenderness. No mastoid tenderness. Tympanic membrane is not perforated or erythematous. Ears:      Comments: Unable to obtain full visualization of right TM due to placement of ear cerumen. Neck:        Comments: Scar located centrally on lower neck   Cardiovascular:      Rate and Rhythm: Normal rate and regular rhythm. Musculoskeletal:      Right shoulder: Normal.      Left shoulder: Normal.      Right upper arm: Normal.      Left upper arm: Normal.      Cervical back: Full passive range of motion without pain. Edema (bilaterally supraclavicular) and tenderness (right upper back muscle tenderness) present. No swelling, deformity, signs of trauma, rigidity, spasms or crepitus. No pain with movement. Normal range of motion. Lymphadenopathy:      Cervical: No cervical adenopathy. Neurological:      Mental Status: She is alert. No results found for this visit on 06/09/22.      Assessment/Plan:  Violeta Maxwell was seen today for cholesterol problem and thyroid problem. Diagnoses and all orders for this visit:    Encounter to establish care  -     Lipid Panel; Future  -     Comprehensive Metabolic Panel; Future  -     TSH with Reflex; Future  -     CBC with Auto Differential; Future  -     EKG 12 Lead; Future  Recommend to make appt for lab review and physical exam.     Non-recurrent acute suppurative otitis media of both ears without spontaneous rupture of tympanic membranes  -     amoxicillin-clavulanate (AUGMENTIN) 875-125 MG per tablet; Take 1 tablet by mouth 2 times daily for 7 days  May use otc Tylenol as needed for pain and/or warm compresses to affected ear to help with fever and pain control. ·       If you are not improving in the next 48 hours please follow-up with our office or primary care, or if you develop worsening symptoms     History of hypercholesterolemia  -     Lipid Panel; Future  -     [address]    Hypertension, unspecified type  -     EKG 12 Lead; Future  -     [address]  -     atenolol (TENORMIN) 100 MG tablet; Take 1 tablet by mouth daily  Atenolol sent to pharmacy as pt is to run out of medication tomorrow.   -Referral given to cardiology  - Reviewed target for BP control and parameters. - Advised patient to check BP at home and keep a log to bring at follow-up w/ PCP.   - Recommend patient on importance of lifestyle modifications for optimal control of blood pressure,exercise, stress reduction and low sodium diet. - Worrisome s/sx and indications for ED/RTC discussed. - F/u in 1 wk or sooner if needed. Localized edema  -     US UNLISTED PROCEDURE NECK/THORAX; Future  -     US SOFT TISSUE LIMITED AREA; Future  Located supraclavicular bilaterally. Pt states has been present since thyroidectomy was performed and has not increased in size. H/O total thyroidectomy  -   -     levothyroxine (SYNTHROID) 100 MCG tablet;  Take 1 tablet by mouth Daily  Refill of levothyroxine sent to pharmacy since pt is to run out of medication tomorrow. Will check Thyroid functions. History of tachycardia  Referral given to cardiology. Advised to f/u w/ cardiology asap. Call initiated to cardiology today in office. EKG performed today w/ HR of 60 and NSR. Acute nonintractable headache, unspecified headache type  -     [address]  Consider r/t BP, levothyroxine, AOM, muscle tightness or other. F/u 1 wk. ER precautions given    Acute pain of right shoulder  -     EKG 12 Lead; Future  -     [facility], [facility]  Recommend ice, exercises, stretching, and PT. No follow-up provider specified.       KARLI Bell - NP

## 2022-06-10 DIAGNOSIS — I10 HYPERTENSION, UNSPECIFIED TYPE: ICD-10-CM

## 2022-06-10 DIAGNOSIS — E89.0 H/O TOTAL THYROIDECTOMY: ICD-10-CM

## 2022-06-10 DIAGNOSIS — M25.511 ACUTE PAIN OF RIGHT SHOULDER: ICD-10-CM

## 2022-06-10 DIAGNOSIS — Z76.89 ENCOUNTER TO ESTABLISH CARE: ICD-10-CM

## 2022-06-10 DIAGNOSIS — Z86.39 HISTORY OF HYPERCHOLESTEROLEMIA: ICD-10-CM

## 2022-06-10 PROCEDURE — 93000 ELECTROCARDIOGRAM COMPLETE: CPT | Performed by: NURSE PRACTITIONER

## 2022-06-12 LAB
ALBUMIN SERPL-MCNC: 3.7 G/DL (ref 3.5–5)
ALBUMIN/GLOB SERPL: 0.9 {RATIO} (ref 1.2–3.5)
ALP SERPL-CCNC: 99 U/L (ref 50–136)
ALT SERPL-CCNC: 55 U/L (ref 12–65)
ANION GAP SERPL CALC-SCNC: 7 MMOL/L (ref 7–16)
AST SERPL-CCNC: 28 U/L (ref 15–37)
BASOPHILS # BLD: 0 K/UL (ref 0–0.2)
BASOPHILS NFR BLD: 0 % (ref 0–2)
BILIRUB SERPL-MCNC: 0.4 MG/DL (ref 0.2–1.1)
BUN SERPL-MCNC: 13 MG/DL (ref 6–23)
CALCIUM SERPL-MCNC: 8.8 MG/DL (ref 8.3–10.4)
CHLORIDE SERPL-SCNC: 108 MMOL/L (ref 98–107)
CHOLEST SERPL-MCNC: 230 MG/DL
CO2 SERPL-SCNC: 25 MMOL/L (ref 21–32)
CREAT SERPL-MCNC: 0.7 MG/DL (ref 0.6–1)
DIFFERENTIAL METHOD BLD: NORMAL
EOSINOPHIL # BLD: 0.1 K/UL (ref 0–0.8)
EOSINOPHIL NFR BLD: 2 % (ref 0.5–7.8)
ERYTHROCYTE [DISTWIDTH] IN BLOOD BY AUTOMATED COUNT: 13.9 % (ref 11.9–14.6)
GLOBULIN SER CALC-MCNC: 4.3 G/DL (ref 2.3–3.5)
GLUCOSE SERPL-MCNC: 74 MG/DL (ref 65–100)
HCT VFR BLD AUTO: 42.2 % (ref 35.8–46.3)
HDLC SERPL-MCNC: 48 MG/DL (ref 40–60)
HDLC SERPL: 4.8 {RATIO}
HGB BLD-MCNC: 13.4 G/DL (ref 11.7–15.4)
IMM GRANULOCYTES # BLD AUTO: 0 K/UL (ref 0–0.5)
IMM GRANULOCYTES NFR BLD AUTO: 0 % (ref 0–5)
LDLC SERPL CALC-MCNC: 155.4 MG/DL
LYMPHOCYTES # BLD: 2.4 K/UL (ref 0.5–4.6)
LYMPHOCYTES NFR BLD: 30 % (ref 13–44)
MCH RBC QN AUTO: 29.9 PG (ref 26.1–32.9)
MCHC RBC AUTO-ENTMCNC: 31.8 G/DL (ref 31.4–35)
MCV RBC AUTO: 94.2 FL (ref 79.6–97.8)
MONOCYTES # BLD: 0.5 K/UL (ref 0.1–1.3)
MONOCYTES NFR BLD: 7 % (ref 4–12)
NEUTS SEG # BLD: 5 K/UL (ref 1.7–8.2)
NEUTS SEG NFR BLD: 61 % (ref 43–78)
NRBC # BLD: 0 K/UL (ref 0–0.2)
PLATELET # BLD AUTO: 335 K/UL (ref 150–450)
PMV BLD AUTO: 9.5 FL (ref 9.4–12.3)
POTASSIUM SERPL-SCNC: 3.8 MMOL/L (ref 3.5–5.1)
PROT SERPL-MCNC: 8 G/DL (ref 6.3–8.2)
RBC # BLD AUTO: 4.48 M/UL (ref 4.05–5.2)
SODIUM SERPL-SCNC: 140 MMOL/L (ref 136–145)
T4 FREE SERPL-MCNC: 1.5 NG/DL (ref 0.9–1.8)
T4 SERPL-MCNC: 10.7 UG/DL (ref 4.8–13.9)
TRIGL SERPL-MCNC: 133 MG/DL (ref 35–150)
TSH W FREE THYROID IF ABNORMAL: 6.34 UIU/ML (ref 0.36–3.74)
VLDLC SERPL CALC-MCNC: 26.6 MG/DL (ref 6–23)
WBC # BLD AUTO: 8.1 K/UL (ref 4.3–11.1)

## 2022-06-14 DIAGNOSIS — E89.0 H/O TOTAL THYROIDECTOMY: Primary | ICD-10-CM

## 2022-06-14 DIAGNOSIS — R79.89 ELEVATED TSH: ICD-10-CM

## 2022-06-14 DIAGNOSIS — E78.00 HYPERCHOLESTEREMIA: ICD-10-CM

## 2022-06-14 DIAGNOSIS — Z76.89 ENCOUNTER TO ESTABLISH CARE: ICD-10-CM

## 2022-06-14 DIAGNOSIS — R60.0 LOCALIZED EDEMA: ICD-10-CM

## 2022-06-20 ENCOUNTER — OFFICE VISIT (OUTPATIENT)
Dept: PRIMARY CARE CLINIC | Facility: CLINIC | Age: 62
End: 2022-06-20

## 2022-06-20 VITALS
HEART RATE: 60 BPM | BODY MASS INDEX: 30.3 KG/M2 | WEIGHT: 171 LBS | HEIGHT: 63 IN | RESPIRATION RATE: 16 BRPM | DIASTOLIC BLOOD PRESSURE: 92 MMHG | SYSTOLIC BLOOD PRESSURE: 150 MMHG | OXYGEN SATURATION: 98 % | TEMPERATURE: 96.9 F

## 2022-06-20 DIAGNOSIS — I10 HYPERTENSION, UNSPECIFIED TYPE: ICD-10-CM

## 2022-06-20 DIAGNOSIS — R73.01 ELEVATED FASTING GLUCOSE: ICD-10-CM

## 2022-06-20 DIAGNOSIS — Z87.898 HISTORY OF TACHYCARDIA: ICD-10-CM

## 2022-06-20 DIAGNOSIS — H52.13 NEAR-SIGHTEDNESS, BILATERAL: ICD-10-CM

## 2022-06-20 DIAGNOSIS — E89.0 H/O TOTAL THYROIDECTOMY: ICD-10-CM

## 2022-06-20 DIAGNOSIS — E78.00 HYPERCHOLESTEREMIA: Primary | ICD-10-CM

## 2022-06-20 DIAGNOSIS — R73.03 PREDIABETES: ICD-10-CM

## 2022-06-20 LAB — HBA1C MFR BLD: 5.9 %

## 2022-06-20 PROCEDURE — 99214 OFFICE O/P EST MOD 30 MIN: CPT | Performed by: NURSE PRACTITIONER

## 2022-06-20 PROCEDURE — 83036 HEMOGLOBIN GLYCOSYLATED A1C: CPT | Performed by: NURSE PRACTITIONER

## 2022-06-20 RX ORDER — ATORVASTATIN CALCIUM 20 MG/1
20 TABLET, FILM COATED ORAL DAILY
Qty: 90 TABLET | Refills: 1 | Status: SHIPPED | OUTPATIENT
Start: 2022-06-20 | End: 2022-07-14 | Stop reason: SDUPTHER

## 2022-06-20 SDOH — ECONOMIC STABILITY: INCOME INSECURITY: IN THE LAST 12 MONTHS, WAS THERE A TIME WHEN YOU WERE NOT ABLE TO PAY THE MORTGAGE OR RENT ON TIME?: NO

## 2022-06-20 ASSESSMENT — ENCOUNTER SYMPTOMS
RHINORRHEA: 1
ABDOMINAL PAIN: 0
SINUS PAIN: 0
BLURRED VISION: 1
SINUS PRESSURE: 0
SORE THROAT: 0
SHORTNESS OF BREATH: 0

## 2022-06-20 ASSESSMENT — PATIENT HEALTH QUESTIONNAIRE - PHQ9
SUM OF ALL RESPONSES TO PHQ9 QUESTIONS 1 & 2: 0
2. FEELING DOWN, DEPRESSED OR HOPELESS: 0
SUM OF ALL RESPONSES TO PHQ QUESTIONS 1-9: 0
1. LITTLE INTEREST OR PLEASURE IN DOING THINGS: 0
SUM OF ALL RESPONSES TO PHQ QUESTIONS 1-9: 0

## 2022-06-20 NOTE — PATIENT INSTRUCTIONS
Patient Education      Patient Education      Patient Education        Estatinas: Instrucciones de cuidado  Statins: Care Instructions  Generalidades     Las estatinas son medicamentos que reducen el colesterol y el riesgo de ataqueal Mirian Linear y ataque cerebral.  El colesterol es un tipo de grasa en la [place]. Si usted tiene [employer], sebastian puede acumularse en los vasos sanguíneos. Ravia eleva atkins riesgode arteriopatía coronaria, ataque cardíaco y ataque cerebral.  Las estatinas bajan el colesterol reduciendo la cantidad que produce el organismo. Ravia impide que el colesterol se acumule en los vasos sanguíneos. Ravia se llama endurecimiento de las arterias. Es el punto de bruno de algunos de los problemas del corazón y de circulación sanguínea, jorge la arteriopatía coronaria. Las estatinas también pueden reducir la inflamación alrededor del depósito (llamado placa). Ravia puede reducir el riesgo de que la placa se rompay provoque un ataque cardíaco o un ataque cerebral.  Un estilo de radha saludable para el corazón es importante para reducir el riesgo ya sea que tome estatinas o no. Ravia incluye comer alimentos saludables,hacer actividad física, mantenerse en un peso saludable y no fumar. Entre los ejemplos de estatinas se incluyen:   Atorvastatina (Lipitor).  Pravastatina (Pravachol).  Simvastatina (Zocor). Las estatinas interactúan con muchos medicamentos. De modo que dígale a atkins médico sobre los demás medicamentos que dewayne. Estos incluyen medicamentos recetados, medicamentos de venta chirag, suplementos dietéticos y productosherbarios. [place] ching estatina con regularidad para que pueda funcionar adal. El colesterol alto no le hace sentirse mal. Es por eso que algunas personas no creen que necesiten camryn atkins medicamento. Beto es importante que tome atkins estatina porque puede reducir atkins riesgo de ataque cardíaco y ataque cerebral. Hable con sumédico si tiene efectos secundarios que le [place].   Sanford Jensen atención de seguimiento es ching parte clave de atkins tratamiento y seguridad. Asegúrese de hacer y acudir a todas las citas, y llame a atkins médico si está teniendo problemas. También es ching buena idea saber los [place] de susexámenes y mantener ching lista de los medicamentos que dewayne. ¿Cómo puede cuidarse en el hogar? [address] estatinas exactamente jorge le indique el [address] colesterol alto no presenta síntomas. Por esta razón, es fácil olvidarse de hola las [place]. Trate de crear un sistema que le recuerde tomarlas.  Consulte con atkins médico o farmacéutico antes de usar otros medicamentos, incluidos los medicamentos de [address] Asegúrese de que atkins médico sepa acerca de todos los medicamentos, vitaminas, productos herbarios y suplementos que dewayne. Hola algunos medicamentos juntos puede [employer].  Llame a atkins médico si tiene efectos secundarios que le molestan. Puede simone diferentes estatinas que puede probar. Colabore con atkins médico para encontrar la estatina y la cantidad que shawn adecuadas para usted.  Lleve un estilo de radha saludable para el corazón. Coma alimentos saludables para el corazón, kate actividad, no fume y [address] en un peso saludable.  Hable con atkins médico sobre evitar el jugo de toronja (pomelo) si dewayne estatinas. El jugo de toronja puede aumentar el nivel de sebastian medicamento en la [place]. [place] podría incrementar los efectos secundarios. ¿Cuándo debe pedir ayuda? Preste especial atención a los cambios en atkins tra y asegúrese de comunicarse con atkins médico si:     Daya que está teniendo problemas con atkins medicamento.      Tiene ton musculares o corporales. ¿Dónde puede encontrar más información en inglés? Jamilah Lambert a [email or web address] e ingrese a atkins cuenta de MyChart. Tari Hinton R358 en el cuadro \"Search Health Information\" para más información (en inglés) sobre \"Estatinas: Instrucciones de cuidado. \"     Si no tiene ching cuenta, kate yovani en el enlace \"Sign Up Now\". Revisado: 10 enero, 2022               Versión del contenido: 13.2  © 2006-2022 Healthwise, Incorporated. Las instrucciones de cuidado fueron adaptadas bajo licencia por [employer]. Si usted tiene [place] afección médica o sobre estas instrucciones, siempre pregunte a atkins profesional de tra. Healthwise, Incorporated niega toda garantía o responsabilidad por atkins uso de esta información. Aprenda sobre el colesterol alto  Learning About High Cholesterol  ¿Qué es el colesterol alto? Tener colesterol alto significa que tiene demasiado colesterol en la [place]. El colesterol es un tipo de Aruna leblanc. Es necesario para muchas funciones corporales, jorge producir nuevas células. El colesterol está producido por el organismo. También proviene de los Bobby h[place]al come. Tener colesterol alto puede causar acumulación de placa en las [place] de lasarterias. [place] puede aumentar atkins riesgo de ataque cardíaco y ataque cerebral.  Cuando atkins médico habla de altos niveles de colesterol, está hablando de los niveles de atkins colesterol total y del colesterol LDL (el colesterol \"sandra\"). Es posible que atkins médico también hable de los niveles de HDL (el colesterol \"yung\"). El HDL alto está vinculado con un riesgo panchito de arteriopatíacoronaria, ataque cardíaco y ataque cerebral.  Jennyfer niveles de colesterol ayudan a atkins médico a determinar atkins riesgo de tener unataque cardíaco o un ataque cerebral.  ¿Cómo puede ayudar a prevenir el colesterol alto? Un estilo de radha saludable para el corazón puede ayudarle a prevenir el colesterol alto y reducir el riesgo de tener un ataque Dorotha Presser y un ataquecerebral.   Coma alimentos saludables para el corazón. ? Coma frutas, verduras, granos integrales, frijoles y otros alimentos ricos en fibra. ? Coma proteínas magras, jorge mariscos, nick [place], frijoles, nueces y productos de soya. ? Coma grasas saludables, jorge aceite de canola y de [place]. ?  Elija alimentos que tengan un bajo contenido de Natasha Conley. ? Limite el sodio y el alcohol. ? [address] y los alimentos con azúcar añadida.  [employer]. Trate de hacer actividad moderada al menos 2½ horas a la semana. O pruebe la actividad intensa al menos 1¼ horas a la semana. Nomi vez desee caminar o probar otras actividades, jorge correr, nadar, [employer] en bicicleta o jugar al tenis o practicar deportes de equipo.  Mantenga un peso saludable. Baje de peso si lo necesita.  No fume. Si necesita ayuda para dejar de fumar, hable con atkins médico sobre programas y medicamentos para dejar de fumar. Estos pueden aumentar duarte probabilidades de dejar el hábito para siempre. ¿Cómo se trata el colesterol alto? La meta de atkins tratamiento es reducir duarte probabilidades de tener un ataque al corazón o un ataque cerebral. La meta no es solamente reducir duarte cifras decolesterol.  Lleve un estilo de radha saludable para el corazón. [place] incluye comer alimentos saludables, no fumar, bajar de peso y [address]  Usted puede optar por camryn medicamentos. La atención de seguimiento es ching parte clave de atkins tratamiento y seguridad. Asegúrese de hacer y acudir a todas las citas, y llame a atkins médico si está teniendo problemas. También es ching buena idea saber los [place] de susexámenes y mantener ching lista de los medicamentos que dewayne. ¿Dónde puede encontrar más información en inglés? Malena Burgos a [email or web address] e ingrese a atkins cuenta de Otoniel F[address] en el cuadro \"Search Health Information\" para más información (en inglés) sobre \"Aprenda sobre el colesterol alto. \"     Si no tiene ching cuenta, kate yovani en el enlace \"Sign Up Now\". Revisado: 10 enero, 2022               Versión del contenido: 13.2  © 7891-1246 Healthwise, Incorporated. Las instrucciones de cuidado fueron adaptadas bajo licencia por [facility] ([facility]).  Si usted tiene [place] afección médica o sobre estas instrucciones, siempre pregunte a atkins profesional de tra. [facility], Inc[employer] niega toda garantía o responsabilidad por atkins uso de esta información. Prediabetes: Instrucciones de cuidado  Prediabetes: Care Instructions  Instrucciones de cuidado  La prediabetes es ching señal de advertencia de que usted está en riesgo de llegar a tener diabetes tipo 2. Champ significa que atkins nivel de azúcar en la vianney es más alto de lo que debiera ser. Los alimentos que usted come se convierten en azúcar, la cual atkins cuerpo [place] para obtener energía. Normalmente, un órgano llamado páncreas produce insulina, la cual permite que el azúcar en la vianney llegue a las células del cuerpo. Beto cuando el cuerpo no puede utilizar la [employer], el azúcar no entra en las células. En cambio, se queda en [employer]. Champ se conoce jorge resistencia a la insulina. La acumulación de azúcar en la vianney causa prediabetes. Lo yung es que hacer cambios en atkins estilo de radha puede ayudarle a hacer que el azúcar en la vianney vuelva a un nivel normal y puede ayudarle a evitar oretrasar la diabetes. La atención de seguimiento es ching parte clave de atkins tratamiento y seguridad. Asegúrese de hacer y acudir a todas las citas, y llame a atkins médico si está teniendo problemas. También es ching buena idea saber los [place] de susexámenes y mantener ching lista de los medicamentos que dewayne. ¿Cómo puede cuidarse en el hogar?  Vigile atkins peso. Un peso saludable ayuda al organismo a usar la insulina de la [place].  Limite la cantidad de calorías, dulces y grasas poco saludables que come. Pregunte a atkins médico si debería consultar a un dietista. Un dietista registrado puede ayudarle a elaborar planes de alimentación que se adapten a atkins estilo de radha.  Luzia por lo menos 30 minutos de ejercicio la mayoría de los días de la [place]. El ejercicio ayuda a controlar el azúcar en atkins vianney. También ayuda a mantener un peso saludable.  Caminar es [place] opción. Es posible que también quiera hacer otras actividades, jorge correr, nadar, [employer], o jugar al tenis u otros deportes de equipo.  No fume. Fumar puede empeorar la prediabetes. Si necesita ayuda para dejar de fumar, hable con atkins médico sobre programas y medicamentos para dejar de fumar. Estos pueden aumentar duarte probabilidades de dejar el hábito para siempre.  Si atkins médico le recetó medicamentos, tómelos exactamente jorge se lo indicó. Llame a atkins médico si stanislav estar teniendo problemas con atkins medicamento. Rec[place]á [employer] medicamentos específicos recetados por atkins médico.  ¿Cuándo debe pedir ayuda? Preste especial atención a los cambios en atkins tra y asegúrese de comunicarse con atkins médico si:     Tiene cualquier síntoma de diabetes. Estos síntomas podrían incluir:  ? Tener sed con más frecuencia. ? [employer]. ? [place]. ? [employer]. ? Sentirse muy cansado. ? Tener visión borrosa.      Tiene ching herida que no cicatriza.      Tiene ching infección que no desaparece.      Tiene problemas con atkins presión arterial.      Desea más información sobre la diabetes y cómo evitarla. ¿Dónde puede encontrar más información en inglés? Louvella Masterson a [email or web address] e ingrese a atkins cuenta de MyChart. Sharmaine Mccormick [id number] en el cuadro \"Search Health Information\" para más información (en inglés) sobre \"Prediabetes: Instrucciones de cuidado. \"     Si no tiene ching cuenta, kate yovani en el enlace \"Sign Up Now\". Revisado: 28 julio, 2021               Versión del contenido: 13.2  © 7237-6263 Healthwise, Incorporated. Las instrucciones de cuidado fueron adaptadas bajo licencia por [facility] ([facility]). Si usted tiene [place] afección médica o sobre estas instrucciones, siempre pregunte a atkins profesional de tra. [employer] niega toda garantía o responsabilidad por atkins uso de esta información.

## 2022-06-20 NOTE — PROGRESS NOTES
Interpretation provided by Fabiola Martinez Han [address] (: 1960) Patient presents for follow up on labs and previous office viist. Pt states she is feeling \"a little better. \"    Denies drainage from ears. States recently had a \"cold\" but symptoms are improving. Denies sore throat, eye discharge, eye redness, cough, fever, chills. +runny nose. Only took 2 of the antibiotic pills perscribed for the ear discharge due to upset stomach. Hyperlipidemia  Recent lipid tests were reviewed and are high. Exacerbating diseases include obesity. Pertinent negatives include no chest pain, leg pain or shortness of breath. Current antihyperlipidemic treatment includes diet change. Risk factors for coronary artery disease include post-menopausal.   Hypertension  This is a chronic problem. The current episode started more than 1 year ago. The problem is unchanged. Associated symptoms include blurred vision (with seeing things far away). Pertinent negatives include no chest pain, palpitations, peripheral edema or shortness of breath. 142/92 at home yesterday. Afternoon was 122/83. Has not taken BP med today. Only has taken Levothyroxine. Eats low fat, low sugar diet. Limits salt. Does not exercise. Prediabetes  States she has known this for past 10 yrs. States she received a call from a clinic on the  but has not heard from endocrinology and is unsure if she has heard back from cardiology. Has not obtained US. Visual blurriness   Visual Acuity Screening    Right eye Left eye Both eyes   Without correction: 50/70 50/70 50/70   With correction:      Pt states she had vision checked last yr and was prescribed glasses. States she ordered glasses but never received. Blurriness occurs in both eyes, affects the whole visual field, and occurs w/ seeing far away only.      Chief Complaint   Patient presents with    Follow-up        Reviewed and updated this visit by Patient : Ailin Garcia Age: 6 year old Sex: female   MRN: [id number] Encounter Date: 9/4/2021      History     Chief Complaint   Patient presents with   • Breathing Problem     HPI   Ailin Garcia is a 6 year old female who presents to the ED accompanied by her mom with cough that began tonight.  Mom states that she heard the patient coughing in her room so she went to her bedside.  Patient was sitting up in bed coughing.  She appeared in some respiratory distress and was wheezing.  She thought the patient was having an asthma exacerbation so she tried giving patient her albuterol however the patient continued to appear in distress so she brought the patient to the emergency department.  She states that at this time it appears as if the albuterol has helped her symptoms as they are improved.      Patient did play outside with that all day today and did not have any coughing or wheezing.  Patient with no known sick contacts but mom states that she did just start school.  Patient with really no other symptoms.  Mom denies any fevers, ear pain, sore throat, vomiting, diarrhea, abdominal pain, urinary symptoms, or any other associated symptoms.    Patient has history of asthma that is controlled with inhaled corticosteroid and albuterol.  Mom states that she only has flare ups a couple of times a year.      PCP: No Pcp       Allergies   Allergen Reactions   • Trees Other (See Comments)     SPT 11/12/2020       Discharge Medication List as of 9/4/2021  2:38 AM      Prior to Admission Medications    Details   albuterol 108 (90 Base) MCG/ACT inhaler Inhale 4 puffs every 4-6 hours as needed for cough/wheeze.  Use with spacer.Historical Med      Asmanex HFA 50 MCG/ACT Aerosol Inhale 2 puffs into the lungs 2 times daily.Historical Med, KAYLAN      Spacer/Aero-Holding Chambers (OptiChamber Ariella-Md Mask) Misc Use as directed with inhalerHistorical Med      Cetirizine HCl (ZYRTEC CHILDRENS ALLERGY PO) Historical Med          New Prescriptions    Details   prednisoLONE (PRELONE) 15 MG/5ML oral solution Take 10.8 mLs by mouth daily for 4 days.Eprescribe, Disp-44 mL, R-0             No past medical history on file.    No past surgical history on file.    No family history on file.    Social History     Tobacco Use   • Smoking status: Not on file   Substance Use Topics   • Alcohol use: Not on file   • Drug use: Not on file       E-cigarette/Vaping     E-Cigarette/Vaping Substances & Devices       Review of Systems   Constitutional: Negative for activity change, appetite change, chills and fever.   HENT: Negative for congestion, rhinorrhea and sore throat.    Eyes: Negative for discharge and redness.   Respiratory: Positive for cough and wheezing. Negative for stridor.    Cardiovascular: Negative for chest pain and leg swelling.   Gastrointestinal: Negative for abdominal pain, diarrhea, nausea and vomiting.   Genitourinary: Negative for decreased urine volume, dysuria and hematuria.   Musculoskeletal: Negative for arthralgias, joint swelling and myalgias.   Skin: Negative for rash.   Neurological: Negative for weakness and headaches.       Physical Exam     ED Triage Vitals   ED Triage Vitals Group      Temp 09/04/21 0041 97.9 °F (36.6 °C)      Heart Rate 09/04/21 0041 121      Resp 09/04/21 0041 22      BP 09/04/21 0214 109/55      SpO2 09/04/21 0041 98 %      EtCO2 mmHg --       Height --       Weight 09/04/21 0041 (!) 71 lb 9.6 oz (32.5 kg)      Weight Scale Used 09/04/21 0041 Standing scale      BMI (Calculated) --       IBW/kg (Calculated) --        Physical Exam  Vitals and nursing note reviewed.   Constitutional:       General: She is active.      Appearance: She is well-developed.      Comments: Pt well appearing.  She is interactive and playful on exam.   HENT:      Head: Normocephalic and atraumatic.      Right Ear: Tympanic membrane and external ear normal.      Left Ear: Tympanic membrane and external ear normal.      Nose:  provider:  Tobacco  Allergies  Meds  Problems  Med Hx  Surg Hx  Fam Hx           Immunizations:  Immunization status: up to date and documented. Review of Systems:   Review of Systems   Constitutional: Negative for chills, fever and unexpected weight change. HENT: Positive for rhinorrhea. Negative for sinus pressure, sinus pain and sore throat. Eyes: Positive for blurred vision (with seeing things far away) and visual disturbance. Respiratory: Negative for shortness of breath. Cardiovascular: Negative for chest pain and palpitations. Gastrointestinal: Negative for abdominal pain. BP (!) 150/92 (Site: Left Upper Arm, Position: Sitting, Cuff Size: Medium Adult)   Pulse 60   Temp 96.9 °F (36.1 °C)   Resp 16   Ht 5' 2.99\" (1.6 m)   Wt 171 lb (77.6 kg)   SpO2 98%   BMI 30.30 kg/m²     Physical Examination: Physical Exam  Constitutional:       General: She is not in acute distress. Appearance: Normal appearance. She is obese. She is not ill-appearing or toxic-appearing. Neck:      Vascular: No carotid bruit. Comments: Right supraclavicular edema w/out erythema or tenderness  Cardiovascular:      Rate and Rhythm: Normal rate and regular rhythm. Pulses: Normal pulses. Posterior tibial pulses are 2+ on the right side and 2+ on the left side. Heart sounds: Normal heart sounds. Pulmonary:      Effort: Pulmonary effort is normal.      Breath sounds: Normal breath sounds. Abdominal:      General: There is no distension. Palpations: Abdomen is soft. There is no mass. Tenderness: There is no abdominal tenderness. There is no guarding or rebound. Hernia: No hernia is present. Musculoskeletal:      Cervical back: No tenderness. Right lower leg: No edema. Left lower leg: No edema. Skin:     General: Skin is warm and dry. Neurological:      Mental Status: She is alert and oriented to person, place, and time.    Psychiatric: Mood and Affect: Mood normal.         Behavior: Behavior normal.         Thought Content: Thought content normal.          Results for orders placed or performed in visit on 06/20/22   AMB POC HEMOGLOBIN A1C   Result Value Ref Range    Hemoglobin A1C, POC 5.9 %        Assessment/Plan:  1. Hypercholesteremia  -     atorvastatin (LIPITOR) 20 MG tablet; Take 1 tablet by mouth daily, Disp-90 tablet, R-1Normal  -     Lipid Panel; Future  Recommend exercise, low fat diet. Reviewed lab work w/ pt. Discussed begin statin and due to cardiovascular risk advise a moderate statin. Pt agrees w/ plan. See educational handout for more information. Repeat labs in 3 months. 2. Hypertension, unspecified type  Suspect due to not taking dose of Atenolol today. Advise f/u w/ cardiology. Printed referral and gave to pt. - Reviewed target for BP control and parameters and how to check BP.  - Advised patient to check BP at home and keep a log to bring at follow-up w/ PCP.   - Counseled patient on importance of lifestyle modifications for optimal control of blood pressure, exercise, stress reduction and low sodium diet. - Worrisome s/sx and indications for ED/RTC discussed. - F/u in 3 months. 3. Prediabetes  A1C in office today 5.9. Advise low sugar diet and weight loss. Educational handout given. 4. H/O total thyroidectomy  Reprinted referral to endocrinology. F/u w/ endocrinology for management. 5. History of tachycardia  Referral to cardiology. 6. Elevated fasting glucose  -     AMB POC HEMOGLOBIN A1C    7. 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 Make appt for wellness exam.   No follow-up provider specified.       KARLI Fontenot NP Nose normal.      Mouth/Throat:      Mouth: Mucous membranes are moist.      Pharynx: Oropharynx is clear.   Eyes:      Conjunctiva/sclera: Conjunctivae normal.      Pupils: Pupils are equal, round, and reactive to light.   Cardiovascular:      Rate and Rhythm: Normal rate and regular rhythm.      Pulses:           Femoral pulses are 2+ on the right side and 2+ on the left side.     Heart sounds: No murmur heard.     Pulmonary:      Effort: Pulmonary effort is normal. No respiratory distress.      Breath sounds: Wheezing present.      Comments: Scattered wheezing noted.  Abdominal:      General: Bowel sounds are normal.      Palpations: Abdomen is soft.      Tenderness: There is no abdominal tenderness.   Musculoskeletal:      Cervical back: Full passive range of motion without pain, normal range of motion and neck supple.   Skin:     General: Skin is warm and dry.      Findings: No rash.   Neurological:      Mental Status: She is alert and oriented for age.      GCS: GCS eye subscore is 4. GCS verbal subscore is 5. GCS motor subscore is 6.      Cranial Nerves: No cranial nerve deficit.      Sensory: No sensory deficit.         ED Course     Procedures    Lab Results     No results found for this visit on 09/04/21.    Radiology Results     Imaging Results          XR Chest PA and Lateral (Final result)  Result time 09/04/21 02:18:10    Final result                 Impression:    IMPRESSION:  1. Hyperinflated lung fields without focal consolidation.                Narrative:    EXAM: XR CHEST PA AND LATERAL    INDICATION: Cough     COMPARISON: None    TECHNIQUE: PA and lateral chest x-ray obtained 9/4/2021.    FINDINGS: Heart size and central vessels are normal. The lungs are  hyperinflated. There is no focal consolidation, pleural effusion, or  pneumothorax.                                ED Medication Orders (From admission, onward)    Ordered Start     Status Ordering Provider    09/04/21 0112 09/04/21 0113   albuterol inhaler 2 puff  ONCE         Last MAR action: Given KATHY ADKINS    09/04/21 0100 09/04/21 0100  prednisoLONE sod-phos (ORAPRED) 15 MG/5ML oral solution 33 mg  ONCE         Last MAR action: Given ALLISON LI BRANDY    09/04/21 0046 09/04/21 0047  albuterol (VENTOLIN) nebulizer 2.5 mg  ONCE         Last MAR action: Given KATHY ADKINS               [facility]    Vitals     Vitals:    09/04/21 0213 09/04/21 0214 09/04/21 0215 09/04/21 0216   BP:  109/55     Pulse: 111 111 101 99   Resp:       Temp:       TempSrc:       SpO2: 97% 93% 98% 98%   Weight:            ED Course/MDM    Pt, 6 year old female, presents with wheezing cough that began this evening.  Patient has history of asthma and mom states that it is usually well controlled on inhaled corticosteroid and albuterol.  Patient just recently started school and mom is concerned that she may have picked up viral infection, possibly COVID-19.  Vitals are stable patient is afebrile.  On exam she has scattered wheezing.  She is interactive and playful and overall very well-appearing.    Wheezing resolved after albuterol treatment in the emergency department.  Chest x-ray is normal.  COVID swab obtained and is pending.  Patient given dose of Orapred and discussed with mom plan to continue this for 4 more days.  Advised her to continue albuterol and inhaled corticosteroid as prescribed.  Encouraged close outpatient follow-up with pediatrician.  Advised patient to remain in quarantine until COVID swab results.  We discussed return to ED warnings.  Mom understands agrees with plan.  All questions answered.        Critical Care time spent on this patient outside of billable procedures: none    Clinical Impression  ED Diagnosis        Final diagnosis    Exacerbation of asthma, unspecified asthma severity, unspecified whether persistent                  Follow Up:  Call your pediatrician to be seen for ER follow up                Summary of your Discharge Medications       Take these Medications      Details   prednisoLONE 15 MG/5ML oral solution  Commonly known as: PRELONE   Take 10.8 mLs by mouth daily for 4 days.            Pt is discharged to home/self care in stable condition.       Joi Walker PA-C   Dictation # [id number]          Joi Walker PA-C  09/04/21 2651

## 2022-07-11 ENCOUNTER — TELEPHONE (OUTPATIENT)
Dept: PRIMARY CARE CLINIC | Facility: CLINIC | Age: 62
End: 2022-07-11

## 2022-07-11 DIAGNOSIS — E89.0 H/O TOTAL THYROIDECTOMY: ICD-10-CM

## 2022-07-11 RX ORDER — LEVOTHYROXINE SODIUM 0.1 MG/1
100 TABLET ORAL DAILY
Qty: 30 TABLET | Refills: 0 | Status: SHIPPED | OUTPATIENT
Start: 2022-07-11 | End: 2022-08-10 | Stop reason: SDUPTHER

## 2022-07-12 DIAGNOSIS — R60.0 LOCALIZED EDEMA: Primary | ICD-10-CM

## 2022-07-14 ENCOUNTER — INITIAL CONSULT (OUTPATIENT)
Dept: CARDIOLOGY CLINIC | Age: 62
End: 2022-07-14

## 2022-07-14 VITALS
HEIGHT: 62 IN | HEART RATE: 70 BPM | DIASTOLIC BLOOD PRESSURE: 88 MMHG | WEIGHT: 172 LBS | BODY MASS INDEX: 31.65 KG/M2 | SYSTOLIC BLOOD PRESSURE: 138 MMHG

## 2022-07-14 DIAGNOSIS — R00.0 TACHYCARDIA: ICD-10-CM

## 2022-07-14 DIAGNOSIS — E78.00 HYPERCHOLESTEREMIA: ICD-10-CM

## 2022-07-14 DIAGNOSIS — Z82.49 FAMILY HISTORY OF EARLY CAD: ICD-10-CM

## 2022-07-14 DIAGNOSIS — I44.0 1ST DEGREE AV BLOCK: ICD-10-CM

## 2022-07-14 DIAGNOSIS — I10 PRIMARY HYPERTENSION: ICD-10-CM

## 2022-07-14 DIAGNOSIS — R06.09 DYSPNEA ON EXERTION: Primary | ICD-10-CM

## 2022-07-14 PROCEDURE — 99204 OFFICE O/P NEW MOD 45 MIN: CPT | Performed by: INTERNAL MEDICINE

## 2022-07-14 RX ORDER — ATORVASTATIN CALCIUM 20 MG/1
20 TABLET, FILM COATED ORAL DAILY
Qty: 90 TABLET | Refills: 3 | Status: SHIPPED | OUTPATIENT
Start: 2022-07-14

## 2022-07-14 ASSESSMENT — ENCOUNTER SYMPTOMS
EYE REDNESS: 0
HEMOPTYSIS: 0
HOARSE VOICE: 0
HEMATEMESIS: 0
ABDOMINAL PAIN: 0
DOUBLE VISION: 0
WHEEZING: 0
HEMATOCHEZIA: 0
STRIDOR: 0

## 2022-07-14 NOTE — PROGRESS NOTES
[address] [address] Briana Suazo, [address]  PHONE: [phone]          22    NAME:  Mariza Walter  : 1960  MRN: [id number]         SUBJECTIVE:   Mariza Walter is a 63 y.o. female seen for a visit regarding the following:     Chief Complaint   Patient presents with    Hypertension    Hyperlipidemia    Consultation           HPI:    Cardio problem list:  1. Hypertension  2. Tachycardia-on atenolol  3. Hyperlipidemia  4. Dyspnea on exertion  5. Hypothyroidism  6. Prediabetes    Dear Sanford Castellon,  I saw Ms. Tevin jarquin 79-year-old woman in cardiovascular consultation on 2022 for hypertension with tachycardia, hyperlipidemia and dyspnea on exertion. Dyspnea on exertion-she says she has noticed this over the past couple months. She says she gets short of breath with any little exertion now and she is not currently working. She says she just has to work at home with just some minimal activity when she got short of breath-NYHA class II-III. No significant lower extremity edema orthopnea PND. No significant wheezing. She has never been a smoker. No history of any DVT or pulmonary embolism and no significant long flights/drives/immobilization/recent surgery.  -No anginal chest discomfort in any way. Tachycardia-longstanding history of this and remains on atenolol. She says this is worked well for her. It has also helped her blood pressures over the years. No history of TIAs or strokelike symptoms. No formal diagnosis of any significant arrhythmia. Hypertension-was borderline hypertensive when she first walked in but she says her home blood pressure readings are usually well controlled in the 255D systolic range at the most.  Usually in the 120s also. Denies headaches or blurry vision. No significant fluid retention in any way.     Hyperlipidemia-was recently started on atorvastatin therapy and joint swelling and muscle weakness. Gastrointestinal: Negative for abdominal pain, hematemesis and hematochezia. Genitourinary: Negative for flank pain and nocturia. Neurological: Negative for focal weakness and seizures. Psychiatric/Behavioral: Negative for altered mental status and suicidal ideas. Allergic/Immunologic: Negative for hives. PHYSICAL EXAM:    /88   Pulse 70   Ht 5' 2.4\" (1.585 m)   Wt 172 lb (78 kg)   BMI 31.06 kg/m²      Physical Exam    General: Alert and oriented in no acute distress  HEENT: Head is normocephalic, atraumatic, pupils are equal bilaterally, throat appears to be clear  Neck: No significant jugular venous distention no cervical bruits  Cardiovascular: S1 and S2 heard, regular rate and rhythm, no significant murmurs rubs or gallops. Respiratory: Clear to auscultation bilaterally with no adventitious sounds, respirations are normal  Abdomen: Soft, nontender, nondistended, bowel sounds present. Extremities: No cyanosis clubbing or edema  Peripheral pulses: Bilateral radial artery pulses are palpated. Bilateral pedal pulses are well felt. Neuro: No facial droop and no gross focal motor deficits  Lymphatic: No significant cervical lymphadenopathy noted. Musculoskeletal: No significant redness or swelling noted in all exposed joints. Skin: No significant rashes noted the of the exposed regions. Medical problems and test results were reviewed with the patient today.      Recent Results (from the past 672 hour(s))   AMB POC HEMOGLOBIN A1C    Collection Time: 06/20/22 10:07 AM   Result Value Ref Range    Hemoglobin A1C, POC 5.9 %     Lab Results   Component Value Date/Time    CHOL 230 06/10/2022 09:36 AM    HDL 48 06/10/2022 09:36 AM   ,hemoglobin, basic metabolic panel, No results found for: TSH, TSH2, TSH3 ,  Lab Results   Component Value Date/Time     06/10/2022 09:36 AM    K 3.8 06/10/2022 09:36 AM     06/10/2022 09:36 AM    CO2 25 06/10/2022 09:36 AM    BUN 13 06/10/2022 09:36 AM    GFRAA >60 06/10/2022 09:36 AM    GLOB 4.3 06/10/2022 09:36 AM    ALT 55 06/10/2022 09:36 AM    AST 28 06/10/2022 09:36 AM      Lab Results   Component Value Date    LDLCALC 155.4 (H) 06/10/2022      Lab Results   Component Value Date    CREATININE 0.70 06/10/2022      No results found for this or any previous visit. ASSESSMENT and PLAN      Dyspnea on exertion  -     Stress echocardiogram (TTE) exercise with contrast, bubble, strain, and 3D PRN order panel; Future  -She has had progressive dyspnea on exertion and she has multiple risk factors including a strong family history of premature coronary artery disease when her father  at the age of 54, hypertension and hyperlipidemia. We will get her through a treadmill based stress echocardiogram to work this up. If abnormal, of course we will see on follow-up but if not we will need to see her in follow-up on an as-needed basis     Primary hypertension  -Reasonably well-controlled on current therapy with atenolol 100 mg once daily. She tells me her home readings are in the range of 130/80 mmHg    Hypercholesteremia  -     atorvastatin (LIPITOR) 20 MG tablet; Take 1 tablet by mouth daily  --Refilled her atorvastatin therapy. I believe that she would need a bigger dose    Tachycardia  --History of this but since she has been on atenolol, this has been well treated. 1st degree AV block  -Likely from the atenolol 100 mg once daily. Family history of early CAD  -     Stress echocardiogram (TTE) exercise with contrast, bubble, strain, and 3D PRN order panel; Future         Overall Impression  I made no changes with her medical therapy but refilled her atorvastatin therapy as she said she was running low. She will need her lipids rechecked.   As she is self-pay, we will get her through a stress echocardiogram to work-up her chest pain as it is clearly concerning for angina with her strong family history of coronary artery disease, hypertension and hyperlipidemia as risk factors. -If abnormal, she understands that we will need to plan coronary angiography but if good, we would only need to see her in follow-up on an as-needed basis. Of course we will see her sooner if needed. Return if symptoms worsen or fail to improve, for white, htn, hld. Thank you for allowing us to participate in the care of your patient. If you have any further questions, please do not hesitate to contact us.   Sincerely,        Jw Brown MD   7/14/2022

## 2022-08-10 ENCOUNTER — TELEPHONE (OUTPATIENT)
Dept: PRIMARY CARE CLINIC | Facility: CLINIC | Age: 62
End: 2022-08-10

## 2022-08-10 DIAGNOSIS — E89.0 H/O TOTAL THYROIDECTOMY: ICD-10-CM

## 2022-08-10 RX ORDER — LEVOTHYROXINE SODIUM 0.1 MG/1
100 TABLET ORAL DAILY
Qty: 5 TABLET | Refills: 0 | Status: SHIPPED | OUTPATIENT
Start: 2022-08-10 | End: 2022-08-15 | Stop reason: SDUPTHER

## 2022-08-15 ENCOUNTER — OFFICE VISIT (OUTPATIENT)
Dept: PRIMARY CARE CLINIC | Facility: CLINIC | Age: 62
End: 2022-08-15

## 2022-08-15 VITALS
TEMPERATURE: 97 F | BODY MASS INDEX: 30.37 KG/M2 | SYSTOLIC BLOOD PRESSURE: 138 MMHG | HEIGHT: 63 IN | HEART RATE: 56 BPM | DIASTOLIC BLOOD PRESSURE: 88 MMHG | WEIGHT: 171.4 LBS | RESPIRATION RATE: 16 BRPM | OXYGEN SATURATION: 97 %

## 2022-08-15 DIAGNOSIS — I10 PRIMARY HYPERTENSION: ICD-10-CM

## 2022-08-15 DIAGNOSIS — Z59.9 FINANCIAL DIFFICULTIES: ICD-10-CM

## 2022-08-15 DIAGNOSIS — E78.00 HYPERCHOLESTEREMIA: ICD-10-CM

## 2022-08-15 DIAGNOSIS — R22.1 NECK SWELLING: ICD-10-CM

## 2022-08-15 DIAGNOSIS — R73.03 PREDIABETES: ICD-10-CM

## 2022-08-15 DIAGNOSIS — H53.8 VISUAL BLURRINESS: Primary | ICD-10-CM

## 2022-08-15 DIAGNOSIS — E89.0 H/O TOTAL THYROIDECTOMY: ICD-10-CM

## 2022-08-15 DIAGNOSIS — M25.511 ACUTE PAIN OF RIGHT SHOULDER: ICD-10-CM

## 2022-08-15 PROBLEM — E11.8 DIABETES MELLITUS TYPE 2 WITH COMPLICATIONS (HCC): Status: RESOLVED | Noted: 2022-05-26 | Resolved: 2022-08-15

## 2022-08-15 PROCEDURE — 99214 OFFICE O/P EST MOD 30 MIN: CPT | Performed by: NURSE PRACTITIONER

## 2022-08-15 RX ORDER — LEVOTHYROXINE SODIUM 0.1 MG/1
100 TABLET ORAL DAILY
Qty: 60 TABLET | Refills: 0 | Status: SHIPPED | OUTPATIENT
Start: 2022-08-15 | End: 2022-09-06 | Stop reason: SDUPTHER

## 2022-08-15 RX ORDER — ATENOLOL 100 MG/1
100 TABLET ORAL DAILY
Qty: 60 TABLET | Refills: 0 | Status: SHIPPED | OUTPATIENT
Start: 2022-08-15 | End: 2022-10-14

## 2022-08-15 SDOH — ECONOMIC STABILITY: FOOD INSECURITY: WITHIN THE PAST 12 MONTHS, YOU WORRIED THAT YOUR FOOD WOULD RUN OUT BEFORE YOU GOT MONEY TO BUY MORE.: NEVER TRUE

## 2022-08-15 SDOH — ECONOMIC STABILITY: FOOD INSECURITY: WITHIN THE PAST 12 MONTHS, THE FOOD YOU BOUGHT JUST DIDN'T LAST AND YOU DIDN'T HAVE MONEY TO GET MORE.: NEVER TRUE

## 2022-08-15 SDOH — ECONOMIC STABILITY: INCOME INSECURITY: IN THE LAST 12 MONTHS, WAS THERE A TIME WHEN YOU WERE NOT ABLE TO PAY THE MORTGAGE OR RENT ON TIME?: NO

## 2022-08-15 SDOH — ECONOMIC STABILITY - INCOME SECURITY: PROBLEM RELATED TO HOUSING AND ECONOMIC CIRCUMSTANCES, UNSPECIFIED: Z59.9

## 2022-08-15 ASSESSMENT — ENCOUNTER SYMPTOMS
BLURRED VISION: 1
EYE PAIN: 0
SORE THROAT: 0
SINUS PRESSURE: 0
EYE ITCHING: 1
DIARRHEA: 0
PHOTOPHOBIA: 0
EYE DISCHARGE: 0
BLOOD IN STOOL: 0
NAUSEA: 0
EYE REDNESS: 0
SHORTNESS OF BREATH: 0
VOMITING: 0
SINUS PAIN: 0
CHEST TIGHTNESS: 0
COUGH: 0
ABDOMINAL PAIN: 0
CHOKING: 0

## 2022-08-15 ASSESSMENT — PATIENT HEALTH QUESTIONNAIRE - PHQ9
SUM OF ALL RESPONSES TO PHQ QUESTIONS 1-9: 0
SUM OF ALL RESPONSES TO PHQ QUESTIONS 1-9: 0
2. FEELING DOWN, DEPRESSED OR HOPELESS: 0
SUM OF ALL RESPONSES TO PHQ QUESTIONS 1-9: 0
SUM OF ALL RESPONSES TO PHQ QUESTIONS 1-9: 0
SUM OF ALL RESPONSES TO PHQ9 QUESTIONS 1 & 2: 0
1. LITTLE INTEREST OR PLEASURE IN DOING THINGS: 0

## 2022-08-15 ASSESSMENT — LIFESTYLE VARIABLES
HOW MANY STANDARD DRINKS CONTAINING ALCOHOL DO YOU HAVE ON A TYPICAL DAY: PATIENT DOES NOT DRINK
HOW OFTEN DO YOU HAVE A DRINK CONTAINING ALCOHOL: NEVER

## 2022-08-15 ASSESSMENT — SOCIAL DETERMINANTS OF HEALTH (SDOH): HOW HARD IS IT FOR YOU TO PAY FOR THE VERY BASICS LIKE FOOD, HOUSING, MEDICAL CARE, AND HEATING?: SOMEWHAT HARD

## 2022-08-15 ASSESSMENT — VISUAL ACUITY: OU: 1

## 2022-08-15 NOTE — PROGRESS NOTES
Han [address] [facility]  (: 1960) Interpretation [facility][id number]    Patient is established and presents for f/u. Saw cardiology on . Stress echo scheduled for . HTN   Checks BP at home. Yesterday was 123/76. Does not have BP log today at office visit. Taking atenolol daily. Denies orthopnea. Hyperlipidemia  Taking Lipitor daily. Neck edema  Has not obtained US. Pt unsure of insurance coverage. Has received calls in [address] but unsure if office has called for imaging appt. Visual blurriness  Had eye exam performed since last office visit. Was prescribed eye drops and glasses. Using glasses for writing and reading. Using eye drops for itchiness. Occurs intermittently. Denies precipitating event. Occurs in both eyes but is worse in right eye. Denies loss of peripheral vision or central vision. Began occurring months ago. Prediabetes  Checking BG at home. Checked BG at home and received level of 95 yesterday morning fasting. Afternoon was 119. Walking 20 mins a day for exercise. 171 lbs today from 174 on 22. Eating a low fat diet. Diet recall. Has not had breakfast yet. B- boiled egg  L-chicken soup  D- cheese, beans      Hypertension  This is a chronic problem. Associated symptoms include blurred vision and malaise/fatigue. Pertinent negatives include no chest pain, headaches, palpitations, peripheral edema or shortness of breath. Risk factors for coronary artery disease include obesity, dyslipidemia and post-menopausal state. Chief Complaint   Patient presents with    Follow-up    Medication Refill        Reviewed and updated this visit by provider:  Tobacco  Allergies  Meds  Problems  Med Hx  Surg Hx  Fam Hx           Immunizations:  Immunization status: up to date and documented. Review of Systems:   Review of Systems   Constitutional:  Positive for fatigue and malaise/fatigue. Negative for chills and fever.    HENT:  Negative for congestion, sinus pressure, sinus pain and sore throat. Eyes:  Positive for blurred vision and itching. Negative for photophobia, pain, discharge and redness. Respiratory:  Negative for cough, choking, chest tightness and shortness of breath. Cardiovascular:  Negative for chest pain, palpitations and leg swelling. Gastrointestinal:  Negative for abdominal pain, blood in stool, diarrhea, nausea and vomiting. Endocrine: Negative for cold intolerance and heat intolerance. Musculoskeletal:  Negative for myalgias. Neurological:  Negative for dizziness, syncope, facial asymmetry, light-headedness and headaches. /88 (Site: Left Upper Arm, Position: Sitting, Cuff Size: Medium Adult)   Pulse 56   Temp (!) 96.5 °F (35.8 °C)   Resp 16   Ht 5' 2.5\" (1.588 m)   Wt 171 lb 6.4 oz (77.7 kg)   SpO2 97%   BMI 30.85 kg/m²     Physical Examination: Physical Exam  Vitals reviewed. Constitutional:       General: She is not in acute distress. Appearance: Normal appearance. She is obese. She is not toxic-appearing. HENT:      Head: Normocephalic and atraumatic. Right Ear: Tympanic membrane, ear canal and external ear normal.      Left Ear: Tympanic membrane, ear canal and external ear normal.      Mouth/Throat:      Mouth: Mucous membranes are moist.      Pharynx: No oropharyngeal exudate or posterior oropharyngeal erythema. Eyes:      General: Lids are normal. Vision grossly intact. Right eye: No discharge. Left eye: No discharge. Extraocular Movements: Extraocular movements intact. Conjunctiva/sclera: Conjunctivae normal.      Pupils: Pupils are equal, round, and reactive to light. Comments: Gross assessment of peripheral vision intact   Neck:      Vascular: No carotid bruit. Comments:  Right supraclavicular edema w/out erythema or tenderness  Cardiovascular:      Rate and Rhythm: Normal rate and regular rhythm.    Pulmonary:      Effort: Pulmonary effort is normal.      Breath sounds: Normal breath sounds. Musculoskeletal:      Cervical back: No tenderness. Lymphadenopathy:      Cervical: No cervical adenopathy. Skin:     General: Skin is warm and dry. Capillary Refill: Capillary refill takes less than 2 seconds. Neurological:      Mental Status: She is alert and oriented to person, place, and time. Psychiatric:         Mood and Affect: Mood normal.         Behavior: Behavior normal.        No results found for this visit on 08/15/22. Assessment/Plan:  1. Visual blurriness  -     [facility] [employer]  Advise to use eye drops to prevent dry eyes. Wear correct as advised. Referral to opthamology  2. Primary hypertension  - Reviewed target for BP control and parameters and how to check BP.  - Advised patient to check BP at home and keep a log to bring at follow-up w/ PCP.   - Counseled patient on importance of lifestyle modifications for optimal control of blood pressure, exercise, stress reduction and low sodium diet. - Worrisome s/sx and indications for ED/RTC discussed. - Keep all appt. F/u in 1 mo    3. H/O total thyroidectomy  -     levothyroxine (SYNTHROID) 100 MCG tablet; Take 1 tablet by mouth in the morning., Disp-60 tablet, R-0Normal  Keep all appts w/ Endocrinology. Continue Synthroid daily  4. Prediabetes  Advised can lessen checking BG at home to only as needed for s/s of hyper or hypoglycemia. Call with glucose less than 50 or greater than 350. Encouraged to follow healthy diabetic diet, reduce carbohydrates (such as rice, pasta, potatoes, and corn) and sweets/sweet drinks in diet, avoid saturated/trans fats/fried and fatty foods. Get regular exercise. Keep all upcoming appointments. Check A1c annually. 5. Neck swelling  Obtain US. 6. Hypercholesteremia  Continue lipitor and low fat diet. Obtain f/u lipid panel as indicated   7. Acute pain of right shoulder   Obtain US. Take otc tylenol as needed. Apply ice for 20 mins at a time. F/u in office for shoulder examination. 8. Financial difficulties  Pt w/ questions on medical bills. , Ann Mcmahan met with patient. No follow-up provider specified.       KARLI Marie - NP

## 2022-09-06 ENCOUNTER — OFFICE VISIT (OUTPATIENT)
Dept: ENDOCRINOLOGY | Age: 62
End: 2022-09-06

## 2022-09-06 VITALS
SYSTOLIC BLOOD PRESSURE: 142 MMHG | DIASTOLIC BLOOD PRESSURE: 82 MMHG | HEART RATE: 82 BPM | BODY MASS INDEX: 30.65 KG/M2 | OXYGEN SATURATION: 98 % | HEIGHT: 63 IN | WEIGHT: 173 LBS

## 2022-09-06 DIAGNOSIS — E03.9 PRIMARY HYPOTHYROIDISM: Primary | ICD-10-CM

## 2022-09-06 DIAGNOSIS — E03.9 PRIMARY HYPOTHYROIDISM: ICD-10-CM

## 2022-09-06 DIAGNOSIS — E55.9 VITAMIN D DEFICIENCY: ICD-10-CM

## 2022-09-06 LAB
25(OH)D3 SERPL-MCNC: 31.5 NG/ML (ref 30–100)
T4 FREE SERPL-MCNC: 1.4 NG/DL (ref 0.78–1.46)
TSH, 3RD GENERATION: 2.8 UIU/ML (ref 0.36–3.74)

## 2022-09-06 PROCEDURE — 99204 OFFICE O/P NEW MOD 45 MIN: CPT | Performed by: INTERNAL MEDICINE

## 2022-09-06 RX ORDER — LEVOTHYROXINE SODIUM 0.1 MG/1
100 TABLET ORAL DAILY
Qty: 90 TABLET | Refills: 3
Start: 2022-09-06 | End: 2022-09-07 | Stop reason: SDUPTHER

## 2022-09-06 ASSESSMENT — ENCOUNTER SYMPTOMS
CHEST TIGHTNESS: 1
VOICE CHANGE: 1
TROUBLE SWALLOWING: 0
SHORTNESS OF BREATH: 0
DIARRHEA: 0
NAUSEA: 0

## 2022-09-06 NOTE — PROGRESS NOTES
Erik Finnegan MD, [address]    AND   THYROID NODULE CLINIC        Reason for visit: Mnua Membreno is referred by KARLI Vogel - KERRY for the evaluation and management of postsurgical hypothyroidism. ASSESSMENT AND PLAN:    1. Primary hypothyroidism  I will check thyroid function test today. If TSH remains elevated, I will adjust her levothyroxine dose. Goal of therapy is normalization of TSH. Return in 3 months with labs. - TSH  - T4, Free  - levothyroxine (SYNTHROID) 100 MCG tablet; Take 1 tablet by mouth Daily  Dispense: 90 tablet; Refill: 3  - TSH; Future  - T4, Free; Future    2. Vitamin D deficiency  She has a history of vitamin D deficiency. I will check vitamin D today. - Vitamin D 25 Hydroxy  - Vitamin D 25 Hydroxy; Future      Follow-up and Dispositions    Return in about 3 months (around 12/6/2022). History of Present Illness:    THYROID DYSFUNCTION  Han Langford is seen for new evaluation/management of hypothyroidism following thyroidectomy in 2011 for treatment of thyroid nodules (surgical pathology benign). Current symptoms:  See review of systems below    Prior treatment: She has been on levothyroxine since diagnosis. She has taken 100 mcg daily since 2019. Pertinent labs:  5/26/2022: TSH 3.490, T4 10.7. 6/10/2022: TSH 6.34, free T4 1.5.     Imaging: none      Medical/Surgical/Social/Family History:  Past Medical History:   Diagnosis Date    Hypertension     Mixed hyperlipidemia     Obesity     Osteopenia     Postsurgical hypothyroidism     Type 2 diabetes mellitus ([address]        Past Surgical History:   Procedure Laterality Date    LIPOMA RESECTION      left shoulder    THYROIDECTOMY  2011       Past Social History: reviewed in chart    Family History   Problem Relation Age of Onset    Heart Attack Father     Heart Attack Paternal Uncle     Thyroid Disease Neg Hx     Thyroid Cancer Neg Hx Medications  Reviewed in chart    Allergies  Patient has no known allergies. Review of Systems   Constitutional:  Positive for fatigue. HENT:  Positive for voice change (occasional hoarseness). Negative for trouble swallowing. Respiratory:  Positive for chest tightness. Negative for shortness of breath. Cardiovascular:  Positive for palpitations (rare). Gastrointestinal:  Negative for diarrhea and nausea. Endocrine: Positive for heat intolerance. Negative for cold intolerance. Genitourinary:  Negative for menstrual problem. Musculoskeletal:  Positive for arthralgias (shoulders). Neurological:  Positive for headaches. Negative for tremors. Psychiatric/Behavioral:  Positive for sleep disturbance. Negative for dysphoric mood. The patient is nervous/anxious. BP (!) 142/82   Pulse 82   Ht 5' 2.5\" (1.588 m)   Wt 173 lb (78.5 kg)   SpO2 98%   BMI 31.14 kg/m²   Wt Readings from Last 3 Encounters:   09/06/22 173 lb (78.5 kg)   08/15/22 171 lb 6.4 oz (77.7 kg)   07/14/22 172 lb (78 kg)       Physical Exam  Constitutional:       Appearance: Normal appearance. HENT:      Head: Normocephalic and atraumatic. Neck:      Thyroid: No thyroid mass or thyromegaly. Comments: Healed thyroidectomy scar. No palpable neck masses. Cardiovascular:      Rate and Rhythm: Normal rate and regular rhythm. Pulmonary:      Breath sounds: Normal breath sounds. Abdominal:      General: There is no distension. Palpations: Abdomen is soft. Musculoskeletal:         General: Normal range of motion. Skin:     General: Skin is warm and dry. Neurological:      General: No focal deficit present. Mental Status: She is alert. Psychiatric:         Mood and Affect: Mood and affect normal.         Speech: Speech normal.         Behavior: Behavior normal.         Thought Content:  Thought content normal.       Orders Placed This Encounter   Procedures    TSH    T4, Free    Vitamin D 25 Hydroxy    TSH     Standing Status:   Future     Standing Expiration Date:   9/6/2023    T4, Free     Standing Status:   Future     Standing Expiration Date:   9/6/2023    Vitamin D 25 Hydroxy     Standing Status:   Future     Standing Expiration Date:   9/6/2023         Current Outpatient Medications   Medication Sig Dispense Refill    CALCIUM PO Take 600 mg by mouth      levothyroxine (SYNTHROID) 100 MCG tablet Take 1 tablet by mouth Daily 90 tablet 3    atenolol (TENORMIN) 100 MG tablet Take 1 tablet by mouth in the morning. 60 tablet 0    atorvastatin (LIPITOR) 20 MG tablet Take 1 tablet by mouth daily 90 tablet 3     No current facility-administered medications for this visit. Donna Cramer MD, FACE      Portions of this note were generated with the assistance of voice recognition software. As such, some errors in transcription may be present.

## 2022-09-07 DIAGNOSIS — E03.9 PRIMARY HYPOTHYROIDISM: ICD-10-CM

## 2022-09-07 RX ORDER — LEVOTHYROXINE SODIUM 0.1 MG/1
100 TABLET ORAL DAILY
Qty: 90 TABLET | Refills: 3 | Status: SHIPPED | OUTPATIENT
Start: 2022-09-07 | End: 2022-11-06

## 2022-09-07 NOTE — PROGRESS NOTES
Patient speaks Niuean: Please let her know that her thyroid levels look great. Continue levothyroxine as currently prescribed. I sent that prescription to her pharmacy.

## 2022-09-23 ENCOUNTER — TELEPHONE (OUTPATIENT)
Dept: CARDIOLOGY CLINIC | Age: 62
End: 2022-09-23
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
## 2022-10-13 ENCOUNTER — HOSPITAL ENCOUNTER (EMERGENCY)
Age: 62
Discharge: HOME | End: 2022-10-13
Attending: EMERGENCY MEDICINE

## 2022-10-13 ENCOUNTER — HOSPITAL ENCOUNTER (EMERGENCY)
Dept: GENERAL RADIOLOGY | Age: 62
Discharge: HOME | End: 2022-10-16

## 2022-10-13 VITALS
TEMPERATURE: 98.3 F | DIASTOLIC BLOOD PRESSURE: 86 MMHG | SYSTOLIC BLOOD PRESSURE: 183 MMHG | WEIGHT: 170 LBS | HEIGHT: 60 IN | BODY MASS INDEX: 33.38 KG/M2 | RESPIRATION RATE: 12 BRPM | OXYGEN SATURATION: 94 % | HEART RATE: 84 BPM

## 2022-10-13 DIAGNOSIS — M25.511 ACUTE PAIN OF RIGHT SHOULDER: Primary | ICD-10-CM

## 2022-10-13 LAB
ALBUMIN SERPL-MCNC: 3.8 G/DL (ref 3.5–5)
ALBUMIN/GLOB SERPL: 0.8 {RATIO} (ref 0.4–1.6)
ALP SERPL-CCNC: 111 U/L (ref 50–130)
ALT SERPL-CCNC: 48 U/L (ref 12–65)
ANION GAP SERPL CALC-SCNC: 6 MMOL/L (ref 2–11)
AST SERPL-CCNC: 24 U/L (ref 15–37)
BASOPHILS # BLD: 0 K/UL (ref 0–0.2)
BASOPHILS NFR BLD: 0 % (ref 0–2)
BILIRUB SERPL-MCNC: 0.3 MG/DL (ref 0.2–1.1)
BUN SERPL-MCNC: 10 MG/DL (ref 6–23)
CALCIUM SERPL-MCNC: 8.9 MG/DL (ref 8.3–10.4)
CHLORIDE SERPL-SCNC: 110 MMOL/L (ref 101–110)
CO2 SERPL-SCNC: 27 MMOL/L (ref 21–32)
CREAT SERPL-MCNC: 0.67 MG/DL (ref 0.6–1)
DIFFERENTIAL METHOD BLD: ABNORMAL
EKG ATRIAL RATE: 64 BPM
EKG DIAGNOSIS: NORMAL
EKG P AXIS: 50 DEGREES
EKG P-R INTERVAL: 166 MS
EKG Q-T INTERVAL: 412 MS
EKG QRS DURATION: 72 MS
EKG QTC CALCULATION (BAZETT): 425 MS
EKG R AXIS: 47 DEGREES
EKG T AXIS: 52 DEGREES
EKG VENTRICULAR RATE: 64 BPM
EOSINOPHIL # BLD: 0.1 K/UL (ref 0–0.8)
EOSINOPHIL NFR BLD: 1 % (ref 0.5–7.8)
ERYTHROCYTE [DISTWIDTH] IN BLOOD BY AUTOMATED COUNT: 13.4 % (ref 11.9–14.6)
GLOBULIN SER CALC-MCNC: 4.6 G/DL (ref 2.8–4.5)
GLUCOSE SERPL-MCNC: 108 MG/DL (ref 65–100)
HCT VFR BLD AUTO: 43.3 % (ref 35.8–46.3)
HGB BLD-MCNC: 14 G/DL (ref 11.7–15.4)
IMM GRANULOCYTES # BLD AUTO: 0 K/UL (ref 0–0.5)
IMM GRANULOCYTES NFR BLD AUTO: 0 % (ref 0–5)
LYMPHOCYTES # BLD: 2.9 K/UL (ref 0.5–4.6)
LYMPHOCYTES NFR BLD: 32 % (ref 13–44)
MCH RBC QN AUTO: 29.9 PG (ref 26.1–32.9)
MCHC RBC AUTO-ENTMCNC: 32.3 G/DL (ref 31.4–35)
MCV RBC AUTO: 92.3 FL (ref 82–102)
MONOCYTES # BLD: 0.6 K/UL (ref 0.1–1.3)
MONOCYTES NFR BLD: 7 % (ref 4–12)
NEUTS SEG # BLD: 5.4 K/UL (ref 1.7–8.2)
NEUTS SEG NFR BLD: 60 % (ref 43–78)
NRBC # BLD: 0 K/UL (ref 0–0.2)
PLATELET # BLD AUTO: 317 K/UL (ref 150–450)
PMV BLD AUTO: 9.1 FL (ref 9.4–12.3)
POTASSIUM SERPL-SCNC: 3.3 MMOL/L (ref 3.5–5.1)
PROT SERPL-MCNC: 8.4 G/DL (ref 6.3–8.2)
RBC # BLD AUTO: 4.69 M/UL (ref 4.05–5.2)
SODIUM SERPL-SCNC: 143 MMOL/L (ref 133–143)
T4 FREE SERPL-MCNC: 1.5 NG/DL (ref 0.78–1.46)
TROPONIN I SERPL HS-MCNC: 5.8 PG/ML (ref 0–14)
TSH W FREE THYROID IF ABNORMAL: 3.81 UIU/ML (ref 0.36–3.74)
WBC # BLD AUTO: 9 K/UL (ref 4.3–11.1)

## 2022-10-13 PROCEDURE — 73030 X-RAY EXAM OF SHOULDER: CPT

## 2022-10-13 PROCEDURE — 72040 X-RAY EXAM NECK SPINE 2-3 VW: CPT

## 2022-10-13 PROCEDURE — 84443 ASSAY THYROID STIM HORMONE: CPT

## 2022-10-13 PROCEDURE — 80053 COMPREHEN METABOLIC PANEL: CPT

## 2022-10-13 PROCEDURE — 6360000002 HC RX W HCPCS: Performed by: STUDENT IN AN ORGANIZED HEALTH CARE EDUCATION/TRAINING PROGRAM

## 2022-10-13 PROCEDURE — 84439 ASSAY OF FREE THYROXINE: CPT

## 2022-10-13 PROCEDURE — 85025 COMPLETE CBC W/AUTO DIFF WBC: CPT

## 2022-10-13 PROCEDURE — 93005 ELECTROCARDIOGRAM TRACING: CPT | Performed by: STUDENT IN AN ORGANIZED HEALTH CARE EDUCATION/TRAINING PROGRAM

## 2022-10-13 PROCEDURE — 99285 EMERGENCY DEPT VISIT HI MDM: CPT

## 2022-10-13 PROCEDURE — 71046 X-RAY EXAM CHEST 2 VIEWS: CPT

## 2022-10-13 PROCEDURE — 84484 ASSAY OF TROPONIN QUANT: CPT

## 2022-10-13 PROCEDURE — 96374 THER/PROPH/DIAG INJ IV PUSH: CPT

## 2022-10-13 RX ORDER — KETOROLAC TROMETHAMINE 15 MG/ML
15 INJECTION, SOLUTION INTRAMUSCULAR; INTRAVENOUS ONCE
Status: COMPLETED | OUTPATIENT
Start: 2022-10-13 | End: 2022-10-13

## 2022-10-13 RX ORDER — MELOXICAM 15 MG/1
15 TABLET ORAL DAILY
Qty: 30 TABLET | Refills: 0 | Status: SHIPPED | OUTPATIENT
Start: 2022-10-13 | End: 2022-11-12

## 2022-10-13 RX ORDER — CHLORZOXAZONE 500 MG/1
500 TABLET ORAL 3 TIMES DAILY PRN
Qty: 30 TABLET | Refills: 0 | Status: SHIPPED | OUTPATIENT
Start: 2022-10-13 | End: 2022-10-23

## 2022-10-13 RX ADMIN — KETOROLAC TROMETHAMINE 15 MG: 15 INJECTION, SOLUTION INTRAMUSCULAR; INTRAVENOUS at 10:59

## 2022-10-13 ASSESSMENT — ENCOUNTER SYMPTOMS
PHOTOPHOBIA: 0
COUGH: 0
CHEST TIGHTNESS: 0
BLOOD IN STOOL: 0
EYE REDNESS: 0
ABDOMINAL PAIN: 0
NAUSEA: 0
DIARRHEA: 0
SINUS PRESSURE: 0
RHINORRHEA: 0
SORE THROAT: 0
EYE DISCHARGE: 0
VOMITING: 0
COLOR CHANGE: 0
SHORTNESS OF BREATH: 0
WHEEZING: 0
EYE PAIN: 0
CONSTIPATION: 0
VOICE CHANGE: 0
APNEA: 0

## 2022-10-13 ASSESSMENT — PAIN SCALES - GENERAL: PAINLEVEL_OUTOF10: 5

## 2022-10-13 ASSESSMENT — PAIN - FUNCTIONAL ASSESSMENT: PAIN_FUNCTIONAL_ASSESSMENT: 0-10

## 2022-10-13 ASSESSMENT — PAIN DESCRIPTION - DESCRIPTORS: DESCRIPTORS: ACHING

## 2022-10-13 NOTE — ED NOTES
I have reviewed discharge instructions with the patient and daughter. The patient and daughter verbalized understanding. Patient left ED via Discharge Method: ambulatory to Home with self and daughter. Opportunity for questions and clarification provided. Patient given 2 scripts. To continue your aftercare when you leave the hospital, you may receive an automated call from our care team to check in on how you are doing. This is a free service and part of our promise to provide the best care and service to meet your aftercare needs.  If you have questions, or wish to unsubscribe from this service please call [phone]. Thank you for Choosing our [facility] Emergency Department.         Maryam Zhu RN  10/13/22 [id number]

## 2022-10-13 NOTE — ED TRIAGE NOTES
Pt presented to  Ed via POV with family with c/o headache and right shoulder pain, worseing onset 1 week. Pt sts that she has a sickness that has become worse (head, ear and shoulder pain). Pt alert and oriented x3, respiratory rate even, non-labored, vital signs stable, denies nausea, vomiting, diarrhea. No acute distress. Will continue to monitor.

## 2022-10-13 NOTE — PROGRESS NOTES
Patient speaks Lao as their preferred language for their healthcare communication. If there are technical problems using the [facility] mobil unit, please contact Language Services for interpretation at:    Senior Melissa -Navigator ([phone])  General phone: [phone] ( [phone])  Email: [email or web address]    Please always document the use of interpreting services (name and/or 's ID number) in your clinical notes. Our interpreters are available for team members working with limited  English proficient (LEP) patients remotely, in person (if needed for special cases), as phone or video interpreters on the [facility] Mobil units.         Thank you,        Kelsea Yun  Senior /Navigator

## 2022-12-02 ENCOUNTER — OFFICE VISIT (OUTPATIENT)
Dept: ENDOCRINOLOGY | Age: 62
End: 2022-12-02

## 2022-12-02 VITALS
DIASTOLIC BLOOD PRESSURE: 88 MMHG | HEART RATE: 70 BPM | OXYGEN SATURATION: 95 % | WEIGHT: 174.6 LBS | BODY MASS INDEX: 34.1 KG/M2 | SYSTOLIC BLOOD PRESSURE: 138 MMHG

## 2022-12-02 DIAGNOSIS — E03.9 PRIMARY HYPOTHYROIDISM: Primary | ICD-10-CM

## 2022-12-02 PROCEDURE — 99213 OFFICE O/P EST LOW 20 MIN: CPT | Performed by: INTERNAL MEDICINE

## 2022-12-02 PROCEDURE — 3075F SYST BP GE 130 - 139MM HG: CPT | Performed by: INTERNAL MEDICINE

## 2022-12-02 PROCEDURE — 3079F DIAST BP 80-89 MM HG: CPT | Performed by: INTERNAL MEDICINE

## 2022-12-02 RX ORDER — LEVOTHYROXINE SODIUM 0.1 MG/1
100 TABLET ORAL DAILY
Qty: 90 TABLET | Refills: 3 | Status: SHIPPED | OUTPATIENT
Start: 2022-12-02 | End: 2023-01-31

## 2022-12-02 RX ORDER — ATENOLOL 100 MG/1
100 TABLET ORAL DAILY
Qty: 90 TABLET | Refills: 3 | Status: SHIPPED | OUTPATIENT
Start: 2022-12-02 | End: 2023-01-31

## 2022-12-02 ASSESSMENT — ENCOUNTER SYMPTOMS
DIARRHEA: 0
CONSTIPATION: 0

## 2022-12-02 NOTE — PROGRESS NOTES
Mir[facility]achandran MD, [address] [facility]        Reason for visit: follow-up of postsurgical hypothyroidism. ASSESSMENT AND PLAN:    1. Primary hypothyroidism  She is biochemically euthyroid (normal TSH should be 0.4-4.5). Continue levothyroxine as prescribed. Return in 6 months with labs. - levothyroxine (SYNTHROID) 100 MCG tablet; Take 1 tablet by mouth Daily  Dispense: 90 tablet; Refill: 3  - TSH; Future  - T4, Free; Future      Follow-up and Dispositions    Return in about 6 months (around 6/2/2023). History of Present Illness:    THYROID DYSFUNCTION  Han Choi is seen for follow-up of hypothyroidism following thyroidectomy in 2011 for treatment of thyroid nodules (surgical pathology benign). Current symptoms:  See review of systems below    Prior treatment: She has been on levothyroxine since diagnosis. She has taken 100 mcg daily since 2019. Pertinent labs:  5/26/2022: TSH 3.490, T4 10.7. 6/10/2022: TSH 6.34, free T4 1.5.  9/6/2022: TSH 2.800, free T4 1.4.  10/13/2022: TSH 3.81, free T4 1.5. Imaging: none      Review of Systems   Constitutional:  Positive for fatigue. Negative for unexpected weight change. Cardiovascular:  Positive for palpitations (rare). Gastrointestinal:  Negative for constipation and diarrhea. Psychiatric/Behavioral:  Positive for sleep disturbance. /88   Pulse 70   Wt 174 lb 9.6 oz (79.2 kg)   SpO2 95%   BMI 34.10 kg/m²   Wt Readings from Last 3 Encounters:   12/02/22 174 lb 9.6 oz (79.2 kg)   10/13/22 170 lb (77.1 kg)   09/23/22 173 lb (78.5 kg)       Physical Exam  HENT:      Head: Normocephalic. Neck:      Thyroid: No thyroid mass or thyromegaly. Comments: Healed thyroidectomy scar. No palpable neck masses. Cardiovascular:      Rate and Rhythm: Normal rate. Pulmonary:      Effort: No respiratory distress. Breath sounds: Normal breath sounds. Neurological:      Mental Status: She is alert. Psychiatric:         Mood and Affect: Mood normal.         Behavior: Behavior normal.       No orders of the defined types were placed in this encounter. Current Outpatient Medications   Medication Sig Dispense Refill    CALCIUM PO Take 600 mg by mouth      atorvastatin (LIPITOR) 20 MG tablet Take 1 tablet by mouth daily 90 tablet 3    meloxicam (MOBIC) 15 MG tablet Take 1 tablet by mouth daily 30 tablet 0    levothyroxine (SYNTHROID) 100 MCG tablet Take 1 tablet by mouth Daily 90 tablet 3    atenolol (TENORMIN) 100 MG tablet Take 1 tablet by mouth in the morning. 60 tablet 0     No current facility-administered medications for this visit. Dameon Mckeon MD, FACE      Portions of this note were generated with the assistance of voice recognition software. As such, some errors in transcription may be present.

## 2023-12-08 ENCOUNTER — APPOINTMENT (OUTPATIENT)
Dept: GENERAL RADIOLOGY | Age: 63
End: 2023-12-08
Payer: OTHER MISCELLANEOUS

## 2023-12-08 ENCOUNTER — HOSPITAL ENCOUNTER (EMERGENCY)
Age: 63
Discharge: HOME | End: 2023-12-08
Attending: EMERGENCY MEDICINE
Payer: OTHER MISCELLANEOUS

## 2023-12-08 VITALS
OXYGEN SATURATION: 96 % | DIASTOLIC BLOOD PRESSURE: 86 MMHG | TEMPERATURE: 97.5 F | SYSTOLIC BLOOD PRESSURE: 145 MMHG | BODY MASS INDEX: 32.03 KG/M2 | RESPIRATION RATE: 20 BRPM | WEIGHT: 164 LBS | HEART RATE: 115 BPM

## 2023-12-08 DIAGNOSIS — S39.012A STRAIN OF LUMBAR REGION, INITIAL ENCOUNTER: Primary | ICD-10-CM

## 2023-12-08 DIAGNOSIS — S80.10XA CONTUSION OF MULTIPLE SITES OF LOWER EXTREMITY, UNSPECIFIED LATERALITY, INITIAL ENCOUNTER: ICD-10-CM

## 2023-12-08 DIAGNOSIS — R07.89 MUSCULOSKELETAL CHEST PAIN: ICD-10-CM

## 2023-12-08 PROCEDURE — 90471 IMMUNIZATION ADMIN: CPT | Performed by: EMERGENCY MEDICINE

## 2023-12-08 PROCEDURE — 6360000002 HC RX W HCPCS: Performed by: EMERGENCY MEDICINE

## 2023-12-08 PROCEDURE — 99284 EMERGENCY DEPT VISIT MOD MDM: CPT

## 2023-12-08 PROCEDURE — 73590 X-RAY EXAM OF LOWER LEG: CPT

## 2023-12-08 PROCEDURE — 71101 X-RAY EXAM UNILAT RIBS/CHEST: CPT

## 2023-12-08 PROCEDURE — 90714 TD VACC NO PRESV 7 YRS+ IM: CPT | Performed by: EMERGENCY MEDICINE

## 2023-12-08 PROCEDURE — 72100 X-RAY EXAM L-S SPINE 2/3 VWS: CPT

## 2023-12-08 PROCEDURE — 73562 X-RAY EXAM OF KNEE 3: CPT

## 2023-12-08 PROCEDURE — 6370000000 HC RX 637 (ALT 250 FOR IP): Performed by: EMERGENCY MEDICINE

## 2023-12-08 RX ORDER — TETANUS AND DIPHTHERIA TOXOIDS ADSORBED 2; 2 [LF]/.5ML; [LF]/.5ML
0.5 INJECTION INTRAMUSCULAR
Status: COMPLETED | OUTPATIENT
Start: 2023-12-08 | End: 2023-12-08

## 2023-12-08 RX ORDER — ATENOLOL 50 MG/1
100 TABLET ORAL ONCE
Status: COMPLETED | OUTPATIENT
Start: 2023-12-08 | End: 2023-12-08

## 2023-12-08 RX ORDER — ACETAMINOPHEN 325 MG/1
650 TABLET ORAL
Status: COMPLETED | OUTPATIENT
Start: 2023-12-08 | End: 2023-12-08

## 2023-12-08 RX ADMIN — ATENOLOL 100 MG: 50 TABLET ORAL at 01:39

## 2023-12-08 RX ADMIN — ACETAMINOPHEN 650 MG: 325 TABLET, FILM COATED ORAL at 01:39

## 2023-12-08 RX ADMIN — TETANUS AND DIPHTHERIA TOXOIDS ADSORBED 0.5 ML: 2; 2 INJECTION INTRAMUSCULAR at 01:41

## 2023-12-08 ASSESSMENT — PAIN SCALES - GENERAL
PAINLEVEL_OUTOF10: 10
PAINLEVEL_OUTOF10: 10

## 2023-12-08 ASSESSMENT — ENCOUNTER SYMPTOMS
SHORTNESS OF BREATH: 0
BACK PAIN: 1
ABDOMINAL PAIN: 0

## 2023-12-08 ASSESSMENT — PAIN - FUNCTIONAL ASSESSMENT: PAIN_FUNCTIONAL_ASSESSMENT: 0-10

## 2023-12-08 ASSESSMENT — LIFESTYLE VARIABLES
HOW OFTEN DO YOU HAVE A DRINK CONTAINING ALCOHOL: NEVER
HOW MANY STANDARD DRINKS CONTAINING ALCOHOL DO YOU HAVE ON A TYPICAL DAY: PATIENT DOES NOT DRINK
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
## 2023-12-08 NOTE — ED PROVIDER NOTES
answered. ICD-10-CM    1. Strain of lumbar region, initial encounter  S39.012A       2. Musculoskeletal chest pain  R07.89       3. Contusion of multiple sites of lower extremity, unspecified laterality, initial encounter  S80.10XA           DISPOSITION Decision To Discharge 12/08/2023 04:50:43 AM       Voice dictation software was used during the making of this note. This software is not perfect and grammatical and other typographical errors may be present. This note has not been completely proofread for errors.      Reji Swanson MD  12/08/23 [id number]

## 2023-12-21 ENCOUNTER — TELEPHONE (OUTPATIENT)
Dept: PRIMARY CARE CLINIC | Facility: CLINIC | Age: 63
End: 2023-12-21
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
## 2023-12-28 NOTE — TELEPHONE ENCOUNTER
Patient has been called multiple times to schedule an appointment for a hospital follow up and no answer, we have also left voicemail to call office back so we can schedule an appointment.

## 2024-01-08 ENCOUNTER — TELEPHONE (OUTPATIENT)
Dept: ENDOCRINOLOGY | Age: 64
End: 2024-01-08

## 2024-01-08 DIAGNOSIS — E03.9 PRIMARY HYPOTHYROIDISM: ICD-10-CM

## 2024-01-08 RX ORDER — LEVOTHYROXINE SODIUM 0.1 MG/1
TABLET ORAL
Qty: 90 TABLET | Refills: 3 | Status: SHIPPED | OUTPATIENT
Start: 2024-01-08

## 2024-02-16 ENCOUNTER — OFFICE VISIT (OUTPATIENT)
Dept: FAMILY MEDICINE CLINIC | Facility: CLINIC | Age: 64
End: 2024-02-16

## 2024-02-16 VITALS
HEIGHT: 63 IN | BODY MASS INDEX: 28.84 KG/M2 | OXYGEN SATURATION: 98 % | SYSTOLIC BLOOD PRESSURE: 150 MMHG | RESPIRATION RATE: 16 BRPM | DIASTOLIC BLOOD PRESSURE: 80 MMHG | HEART RATE: 66 BPM | TEMPERATURE: 98.7 F | WEIGHT: 162.8 LBS

## 2024-02-16 DIAGNOSIS — I10 PRIMARY HYPERTENSION: Primary | ICD-10-CM

## 2024-02-16 DIAGNOSIS — R73.03 PREDIABETES: ICD-10-CM

## 2024-02-16 DIAGNOSIS — R10.13 EPIGASTRIC PAIN: ICD-10-CM

## 2024-02-16 DIAGNOSIS — Z59.9 FINANCIAL DIFFICULTIES: ICD-10-CM

## 2024-02-16 RX ORDER — LOSARTAN POTASSIUM 25 MG/1
25 TABLET ORAL DAILY
Qty: 30 TABLET | Refills: 0 | Status: SHIPPED | OUTPATIENT
Start: 2024-02-16

## 2024-02-16 SDOH — ECONOMIC STABILITY - INCOME SECURITY: PROBLEM RELATED TO HOUSING AND ECONOMIC CIRCUMSTANCES, UNSPECIFIED: Z59.9

## 2024-02-16 NOTE — PATIENT INSTRUCTIONS
[phone]; [place] [address]  Información útil: debe llamar para bob las horas y la disponibilidad.    *La elegibilidad variará. Última revisión: Octubre de 2023 Recursos financieros de [place]*  (Llame al 211 si necesita más recursos)    Asistencia financiera de [facility]  Lo que ofrecen: ayuda a los pacientes sin seguro que no reúnen los requisitos para el seguro médico patrocinado por el gobierno y no pueden pagar atkins atención médica. Los pacientes con seguro también pueden reunir los requisitos dependiendo de los ingresos familiares, el tamaño de la susy y las necesidades médicas.   Contacto: [phone]   Información útil: Cómo presentar la solicitud:  Opción 1: complete la solicitud de asistencia financiera (FAP). Se pueden obtener copias de la solicitud de asistencia financiera y obtener el Programa de Asistencia Financiera (FAP) de forma gratuita llamando al departamento de atención al cliente de [facility] al [phone].   Opción 2: descargue ching copia del sitio web de [facility]: [email or web address]     [employer]   Lo que ofrecen: puede ayudar con las facturas médicas si no tiene seguro.  Asistencia para la elegibilidad: [phone]    FOCUS  Lo que ofrecen: asistencia con el costo de los medicamentos, artículos de cuidado personal y pequeños equipos médicos duraderos a pacientes con pocos ingresos y sin seguro médico con afecciones de tra crónicas.   Contacto: [phone] o [phone]     Wellness Outreach  Lo que ofrecen: conexiones con la asistencia financiera para servicios sociales y médicos para personas con bajos ingresos y sin seguro.   Contacto: [phone] (deje el mensaje con el nombre y el número de contacto si no hay respuesta).    Asistencia para servicios públicos    [employer] Low Income Home [employer] Assistance Program  Lo que ofrecen: asistencia energética.  Contacto:

## 2024-03-15 ENCOUNTER — OFFICE VISIT (OUTPATIENT)
Dept: FAMILY MEDICINE CLINIC | Facility: CLINIC | Age: 64
End: 2024-03-15

## 2024-03-15 VITALS
WEIGHT: 168.31 LBS | OXYGEN SATURATION: 99 % | SYSTOLIC BLOOD PRESSURE: 130 MMHG | DIASTOLIC BLOOD PRESSURE: 88 MMHG | BODY MASS INDEX: 29.82 KG/M2 | HEART RATE: 67 BPM | TEMPERATURE: 98.2 F | HEIGHT: 63 IN

## 2024-03-15 DIAGNOSIS — I10 PRIMARY HYPERTENSION: Primary | ICD-10-CM

## 2024-03-15 DIAGNOSIS — G89.29 CHRONIC BILATERAL LOW BACK PAIN WITH LEFT-SIDED SCIATICA: ICD-10-CM

## 2024-03-15 DIAGNOSIS — M54.42 CHRONIC BILATERAL LOW BACK PAIN WITH LEFT-SIDED SCIATICA: ICD-10-CM

## 2024-03-15 PROCEDURE — 99214 OFFICE O/P EST MOD 30 MIN: CPT

## 2024-03-15 PROCEDURE — 3079F DIAST BP 80-89 MM HG: CPT

## 2024-03-15 PROCEDURE — 3075F SYST BP GE 130 - 139MM HG: CPT

## 2024-03-15 RX ORDER — CYCLOBENZAPRINE HCL 10 MG
10 TABLET ORAL NIGHTLY PRN
Qty: 10 TABLET | Refills: 0 | Status: SHIPPED | OUTPATIENT
Start: 2024-03-15 | End: 2024-03-25

## 2024-03-15 RX ORDER — LOSARTAN POTASSIUM 25 MG/1
25 TABLET ORAL DAILY
Qty: 90 TABLET | Refills: 1 | Status: SHIPPED | OUTPATIENT
Start: 2024-03-15

## 2024-03-15 RX ORDER — IBUPROFEN 800 MG/1
800 TABLET ORAL 2 TIMES DAILY PRN
Qty: 28 TABLET | Refills: 0 | Status: SHIPPED | OUTPATIENT
Start: 2024-03-15 | End: 2024-03-29

## 2024-03-15 NOTE — PROGRESS NOTES
Cardiovascular:      Rate and Rhythm: Normal rate and regular rhythm.      Heart sounds: Normal heart sounds.   Pulmonary:      Effort: Pulmonary effort is normal.      Breath sounds: Normal breath sounds.   Musculoskeletal:      Lumbar back: Tenderness and bony tenderness present. No swelling. Normal range of motion.        Back:    Skin:     General: Skin is warm and dry.   Neurological:      Mental Status: She is alert and oriented to person, place, and time.   Psychiatric:         Mood and Affect: Mood normal.         Behavior: Behavior normal.       Return in about 6 weeks (around 4/26/2024) for chronic back pain.    An electronic signature was used to authenticate this note.  -- Fransisca Flores, KARLI - CNP

## 2024-03-15 NOTE — PATIENT INSTRUCTIONS
You can also try Tylenol 500 mg every 8 hours as needed for the pain.     You can try Lidocaine patches 4% over the counter. You can place this over the areas where the pain is present at your back.

## 2024-04-26 ENCOUNTER — OFFICE VISIT (OUTPATIENT)
Dept: FAMILY MEDICINE CLINIC | Facility: CLINIC | Age: 64
End: 2024-04-26
Payer: OTHER MISCELLANEOUS

## 2024-04-26 VITALS
BODY MASS INDEX: 28.89 KG/M2 | OXYGEN SATURATION: 98 % | SYSTOLIC BLOOD PRESSURE: 136 MMHG | HEIGHT: 63 IN | TEMPERATURE: 98.4 F | DIASTOLIC BLOOD PRESSURE: 84 MMHG | HEART RATE: 75 BPM | WEIGHT: 163.06 LBS

## 2024-04-26 DIAGNOSIS — R20.0 NUMBNESS OF LEFT ANTERIOR THIGH: ICD-10-CM

## 2024-04-26 DIAGNOSIS — R29.898 WEAKNESS OF LEFT LEG: ICD-10-CM

## 2024-04-26 DIAGNOSIS — M54.42 CHRONIC BILATERAL LOW BACK PAIN WITH LEFT-SIDED SCIATICA: Primary | ICD-10-CM

## 2024-04-26 DIAGNOSIS — G89.29 CHRONIC BILATERAL LOW BACK PAIN WITH LEFT-SIDED SCIATICA: Primary | ICD-10-CM

## 2024-04-26 PROCEDURE — 3079F DIAST BP 80-89 MM HG: CPT

## 2024-04-26 PROCEDURE — 99214 OFFICE O/P EST MOD 30 MIN: CPT

## 2024-04-26 PROCEDURE — 3075F SYST BP GE 130 - 139MM HG: CPT

## 2024-04-26 NOTE — PROGRESS NOTES
Exam  Constitutional:       Appearance: Normal appearance.   Musculoskeletal:      Lumbar back: Tenderness and bony tenderness present. Normal range of motion. Positive right straight leg raise test and positive left straight leg raise test.      Comments: TTP over lumbar spine and left paraspinal muscles.    Neurological:      Mental Status: She is alert.      Sensory: Sensory deficit present.      Motor: Weakness present.      Coordination: Coordination is intact.      Gait: Gait is intact.      Comments: Noted numbness at L3/L4 region of left anterior thigh on exam.     Weakness on dorsiflexion and plantar flexion with left leg compared to right leg.        Return in about 6 months (around 10/26/2024) for chronic F/U .    An electronic signature was used to authenticate this note.  -- KARLI Perdomo - CNP

## 2024-04-26 NOTE — PATIENT INSTRUCTIONS
Would recommend continuing with Ibuprofen 600 mg every 8 hours as needed. You can rotate this with Tylenol 500 mg every 8 hours as needed.     Can try Lidocaine 4% patches- you can do two at one time over the affected area for up to 12 hours.     Radiology will be calling to schedule the MRI.     The Orthopedic office will call to schedule an appointment.

## 2024-05-14 ENCOUNTER — OFFICE VISIT (OUTPATIENT)
Age: 64
End: 2024-05-14

## 2024-05-14 VITALS — BODY MASS INDEX: 30.04 KG/M2 | WEIGHT: 166.9 LBS

## 2024-05-14 DIAGNOSIS — M54.16 LUMBAR RADICULOPATHY: ICD-10-CM

## 2024-05-14 DIAGNOSIS — M54.50 LOW BACK PAIN, UNSPECIFIED BACK PAIN LATERALITY, UNSPECIFIED CHRONICITY, UNSPECIFIED WHETHER SCIATICA PRESENT: Primary | ICD-10-CM

## 2024-05-14 PROCEDURE — 99204 OFFICE O/P NEW MOD 45 MIN: CPT | Performed by: NURSE PRACTITIONER

## 2024-05-14 RX ORDER — GABAPENTIN 100 MG/1
100-300 CAPSULE ORAL NIGHTLY
Qty: 90 CAPSULE | Refills: 0 | Status: SHIPPED | OUTPATIENT
Start: 2024-05-14 | End: 2024-06-13

## 2024-05-14 NOTE — PROGRESS NOTES
Name: Han Garcia  YOB: 1960  Gender: female  MRN: [id number]    CC: Low back pain, bilateral leg pain left greater than right    HPI: This is a 63 y.o. year old female who who has had low back pain radiating into both hips and down the left leg x 5 years.  She is from [facility].  She speaks Burmese.   is provided at today's appointment.  She is hospital sponsorship.  She has taken NSAIDs with no relief.  Primarily pain is in the lower back rating down the left lateral leg.      The patient denies any change in bowel or bladder function since the onset of the symptoms. she  has not had lumbar surgery in the past.      Thus far, the patient has tried NSAIDS and physician directed home exercise program    Current pain level: 6  Activities limited by pain: All activities           5/14/2024     2:37 PM   AMB PAIN ASSESSMENT   Location of Pain Back   Location Modifiers Right;Left   Frequency of Pain Intermittent   Limiting Behavior Yes   Result of Injury No   Work-Related Injury No   Are there other pain locations you wish to document? No            ROS/Meds/PSH/PMH/FH/SH: I personally reviewed the patient's collected intake data.  Below are the pertinents:    No Known Allergies      Current Outpatient Medications:     gabapentin (NEURONTIN) 100 MG capsule, Take 1-3 capsules by mouth nightly for 30 days., Disp: 90 capsule, Rfl: 0    losartan (COZAAR) 25 MG tablet, Take 1 tablet by mouth daily, Disp: 90 tablet, Rfl: 1    ibuprofen (ADVIL;MOTRIN) 800 MG tablet, Take 1 tablet by mouth 2 times daily as needed for Pain, Disp: 28 tablet, Rfl: 0    levothyroxine (SYNTHROID) 100 MCG tablet, Take 1 by mouth daily, Disp: 90 tablet, Rfl: 3    Past Surgical History:   Procedure Laterality Date    LIPOMA RESECTION      left shoulder    THYROIDECTOMY  2011       Patient Active Problem List   Diagnosis    Primary hypertension    Acquired hypothyroidism    Mixed hyperlipidemia

## 2024-05-21 ENCOUNTER — OFFICE VISIT (OUTPATIENT)
Age: 64
End: 2024-05-21

## 2024-05-21 DIAGNOSIS — M89.9 LESION OF VERTEBRA: Primary | ICD-10-CM

## 2024-05-21 DIAGNOSIS — M54.16 LUMBAR RADICULOPATHY: ICD-10-CM

## 2024-05-21 DIAGNOSIS — M51.36 LUMBAR DEGENERATIVE DISC DISEASE: ICD-10-CM

## 2024-05-21 PROCEDURE — 99214 OFFICE O/P EST MOD 30 MIN: CPT | Performed by: NURSE PRACTITIONER

## 2024-05-21 RX ORDER — CYCLOBENZAPRINE HCL 5 MG
5 TABLET ORAL 3 TIMES DAILY PRN
Qty: 30 TABLET | Refills: 0 | Status: SHIPPED | OUTPATIENT
Start: 2024-05-21 | End: 2024-05-31

## 2024-05-21 NOTE — PROGRESS NOTES
Name: Han Garcia  YOB: 1960  Gender: female  MRN: [id number]    CC: Follow-up low back pain, left leg pain    HPI: This is a 63 y.o. year old female who was referred to me for complaints of back pain radiating into both hips but down the left lateral leg x 5 years.  She is from [facility].  She speaks only Azeri.   is provided at today's appointment.  She is on hospital sponsorship.  She has tried ibuprofen with no relief.  She does not like the way that gabapentin is making her feel.  We had started that at night.  Therefore she was instructed to discontinue this.  At last visit I referred her for an MRI of the lumbar spine.      Medications trialed: Ibuprofen and gabapentin    Current pain level: 6  Activities limited by pain: All activities             5/14/2024     2:37 PM   AMB PAIN ASSESSMENT   Location of Pain Back   Location Modifiers Right;Left   Frequency of Pain Intermittent   Limiting Behavior Yes   Result of Injury No   Work-Related Injury No   Are there other pain locations you wish to document? No            No Known Allergies    Current Outpatient Medications:     cyclobenzaprine (FLEXERIL) 5 MG tablet, Take 1 tablet by mouth 3 times daily as needed for Muscle spasms, Disp: 30 tablet, Rfl: 0    gabapentin (NEURONTIN) 100 MG capsule, Take 1-3 capsules by mouth nightly for 30 days., Disp: 90 capsule, Rfl: 0    losartan (COZAAR) 25 MG tablet, Take 1 tablet by mouth daily, Disp: 90 tablet, Rfl: 1    ibuprofen (ADVIL;MOTRIN) 800 MG tablet, Take 1 tablet by mouth 2 times daily as needed for Pain, Disp: 28 tablet, Rfl: 0    levothyroxine (SYNTHROID) 100 MCG tablet, Take 1 by mouth daily, Disp: 90 tablet, Rfl: 3  Past Medical History:   Diagnosis Date    Hypertension     Mixed hyperlipidemia     Obesity     Osteopenia     Postsurgical hypothyroidism     Type 2 diabetes mellitus (HCC)      Tobacco:  reports that she has never smoked. She has never used

## 2024-06-06 ENCOUNTER — EVALUATION (OUTPATIENT)
Age: 64
End: 2024-06-06

## 2024-06-06 DIAGNOSIS — G89.29 CHRONIC LEFT-SIDED LOW BACK PAIN WITH LEFT-SIDED SCIATICA: Primary | ICD-10-CM

## 2024-06-06 DIAGNOSIS — M51.36 LUMBAR DEGENERATIVE DISC DISEASE: ICD-10-CM

## 2024-06-06 DIAGNOSIS — M54.16 LUMBAR RADICULOPATHY: ICD-10-CM

## 2024-06-06 DIAGNOSIS — Z78.9 DECREASED ACTIVITIES OF DAILY LIVING (ADL): ICD-10-CM

## 2024-06-06 DIAGNOSIS — R26.9 ABNORMALITY OF GAIT: ICD-10-CM

## 2024-06-06 DIAGNOSIS — M62.81 MUSCLE WEAKNESS (GENERALIZED): ICD-10-CM

## 2024-06-06 DIAGNOSIS — R68.89 DECREASED ACTIVITY TOLERANCE: ICD-10-CM

## 2024-06-06 DIAGNOSIS — M54.42 CHRONIC LEFT-SIDED LOW BACK PAIN WITH LEFT-SIDED SCIATICA: Primary | ICD-10-CM

## 2024-06-06 PROCEDURE — 97161 PT EVAL LOW COMPLEX 20 MIN: CPT | Performed by: PHYSICAL THERAPIST

## 2024-06-06 PROCEDURE — 97110 THERAPEUTIC EXERCISES: CPT | Performed by: PHYSICAL THERAPIST

## 2024-06-06 NOTE — PROGRESS NOTES
Nothing relieves the pain. Some relief with ibuprofen and frequent change in positions. It appears that she may be extension biased. Patient did not present with significant guarding during evaluation. There is LOM noted in the spine. PAINFUL movement: flexion, SB L and rotation L     Received previous outpatient therapy? No    Pain Assessment:  Pain location: Back radiating to left leg     Average Pain/symptom intensity (0-10 scale)  Last 24 hours: 8/10  Last week (1-7 days): 8/10  How often do you feel symptoms? Constant (% of time)  Description: aching, burning, dull, sharp, stabbing, throbbing, and tingling  Aggravating factors: rolling over in bed, sit-stand, bending, lifting , prolonged sitting, prolonged standing, walking, squatting, and cough/sneeze  Alleviating factors: walking, lying down, medication: Ibuprofen, and frequent change in symptoms    Neuro screen: numbness to left lateral leg, tingling to lateral thigh, and radiating pain back to foot    Social/Functional Hx:  How would you rate your overall health? poor  Pt lives with family in a(n) 1 story house with  no steps .   Current DME: none  Work Status: Homemaker  Sleep: severely disturbed (less than 2 hours sleep) and wakes 3 times/night  PRIOR LEVEL OF FUNCTION:  min to no back pain, independent with ADL's  CURRENT LEVEL OF FUNCTION:   Decreased sitting/driving tolerance > 15 minutes  Decreased walking tolerance > 15 minutes  Decreased functional lifting > 10#   Unable/decreased squatting tolerance  Decreased sleeping >6 hours  Decreased homemaking washing dishes, vacuuming  Decreased standing tolerance > 25 minutes    How much have your symptoms interfered with daily activities? Extremely    Patient Stated Goals: Help with pain    OBJECTIVE EXAMINATION     Functional Outcome Questionnaire: Modified Oswestry Low Back Pain Disability Questionnaire: 27/50= 54% functional deficit     Pain: FLEXION, SB L, ROT L  Observation:   Posture: Decreased

## 2024-06-18 ENCOUNTER — TELEPHONE (OUTPATIENT)
Age: 64
End: 2024-06-18

## 2024-06-18 NOTE — TELEPHONE ENCOUNTER
Pt did not show for their scheduled therapy appointment today.    Reason: transportation  Communication: Spoke to patient via  from language line and confirm all future appointments. She states that she did not make the appointment today because she did not have the means to get here.

## 2024-06-20 ENCOUNTER — TREATMENT (OUTPATIENT)
Age: 64
End: 2024-06-20

## 2024-06-20 DIAGNOSIS — M54.42 CHRONIC LEFT-SIDED LOW BACK PAIN WITH LEFT-SIDED SCIATICA: Primary | ICD-10-CM

## 2024-06-20 DIAGNOSIS — M51.36 LUMBAR DEGENERATIVE DISC DISEASE: ICD-10-CM

## 2024-06-20 DIAGNOSIS — R68.89 DECREASED ACTIVITY TOLERANCE: ICD-10-CM

## 2024-06-20 DIAGNOSIS — Z78.9 DECREASED ACTIVITIES OF DAILY LIVING (ADL): ICD-10-CM

## 2024-06-20 DIAGNOSIS — G89.29 CHRONIC LEFT-SIDED LOW BACK PAIN WITH LEFT-SIDED SCIATICA: Primary | ICD-10-CM

## 2024-06-20 DIAGNOSIS — R26.9 ABNORMALITY OF GAIT: ICD-10-CM

## 2024-06-20 DIAGNOSIS — M62.81 MUSCLE WEAKNESS (GENERALIZED): ICD-10-CM

## 2024-06-20 DIAGNOSIS — M54.16 LUMBAR RADICULOPATHY: ICD-10-CM

## 2024-06-20 PROBLEM — M51.369 LUMBAR DEGENERATIVE DISC DISEASE: Status: ACTIVE | Noted: 2024-06-20

## 2024-06-20 PROCEDURE — 97140 MANUAL THERAPY 1/> REGIONS: CPT | Performed by: PHYSICAL THERAPIST

## 2024-06-20 PROCEDURE — 97110 THERAPEUTIC EXERCISES: CPT | Performed by: PHYSICAL THERAPIST

## 2024-06-25 ENCOUNTER — TREATMENT (OUTPATIENT)
Age: 64
End: 2024-06-25

## 2024-06-25 DIAGNOSIS — M62.81 MUSCLE WEAKNESS (GENERALIZED): ICD-10-CM

## 2024-06-25 DIAGNOSIS — G89.29 CHRONIC LEFT-SIDED LOW BACK PAIN WITH LEFT-SIDED SCIATICA: ICD-10-CM

## 2024-06-25 DIAGNOSIS — R68.89 DECREASED ACTIVITY TOLERANCE: ICD-10-CM

## 2024-06-25 DIAGNOSIS — R26.9 ABNORMALITY OF GAIT: Primary | ICD-10-CM

## 2024-06-25 DIAGNOSIS — Z78.9 DECREASED ACTIVITIES OF DAILY LIVING (ADL): ICD-10-CM

## 2024-06-25 DIAGNOSIS — M51.36 LUMBAR DEGENERATIVE DISC DISEASE: ICD-10-CM

## 2024-06-25 DIAGNOSIS — M54.42 CHRONIC LEFT-SIDED LOW BACK PAIN WITH LEFT-SIDED SCIATICA: ICD-10-CM

## 2024-06-25 DIAGNOSIS — M54.16 LUMBAR RADICULOPATHY: ICD-10-CM

## 2024-06-25 PROCEDURE — 97110 THERAPEUTIC EXERCISES: CPT | Performed by: PHYSICAL THERAPIST

## 2024-06-25 PROCEDURE — 97140 MANUAL THERAPY 1/> REGIONS: CPT | Performed by: PHYSICAL THERAPIST

## 2024-06-25 NOTE — PROGRESS NOTES
[facility]  [address] [place] [id number]  Dept: [phone]      Physical Therapy Daily Note     Referring MD: Rell Celis APRN*  Diagnosis:     ICD-10-CM    1. Abnormality of gait  R26.9       2. Muscle weakness (generalized)  M62.81       3. Chronic left-sided low back pain with left-sided sciatica  M54.42     G89.29       4. Lumbar radiculopathy [M54.16]  M54.16       5. Decreased activity tolerance  R68.89       6. Decreased activities of daily living (ADL)  Z78.9       7. Lumbar degenerative disc disease [M51.36]  M51.36         Surgery: None    Therapy precautions: Needs   Co-morbidities affecting plan of care: None    Payor: Payor: / No coverage found. Billing pattern: Other - self pay. Hospital Sponsored  Total Timed Procedure Codes: 45 min   Total Time: 45 min   Modifier needed: No    Episode visit count:  3   Medications: reviewed in chart   Diagnostic exams (per chart review): MRI reveals very minimal lateral recess narrowing at L4-5 due to a disc bulge. There is mild facet arthropathy. The L2 lesion is noted.     SUBJECTIVE     Reports pain is better over all but today pain is radiating to the leg.     OBJECTIVE EXAMINATION     Increased tenderness noted on the L paraspinals.       Treatment provided today consisted of:  Therapeutic exercise (13620) x 30 min to address ROM/strength deficits and to develop an HEP as noted below.    Due to language barrier, an in person  was present during the treatment and subsequent discussion (and for part of the physical exam) with this patient.     Prone Press Up On Elbows  - 5-10 reps - 2 hold  Prone Press Up  - 5-10 reps - 2 hold  S/L hip abduction with red T band x 10 B   S/L and supine B clams x 10   Bridges x 10 x 2  Side-lying Shoulder PNF D2 Flexion and Extension  - 3 x daily - 7 x weekly - 1-2 sets - 5-10 reps - 1-2 hold  Prone hip rotations x 15   Educated on symptom

## 2024-06-28 ENCOUNTER — TREATMENT (OUTPATIENT)
Age: 64
End: 2024-06-28

## 2024-06-28 DIAGNOSIS — M62.81 MUSCLE WEAKNESS (GENERALIZED): ICD-10-CM

## 2024-06-28 DIAGNOSIS — G89.29 CHRONIC LEFT-SIDED LOW BACK PAIN WITH LEFT-SIDED SCIATICA: ICD-10-CM

## 2024-06-28 DIAGNOSIS — M54.16 LUMBAR RADICULOPATHY: ICD-10-CM

## 2024-06-28 DIAGNOSIS — R68.89 DECREASED ACTIVITY TOLERANCE: ICD-10-CM

## 2024-06-28 DIAGNOSIS — R26.9 ABNORMALITY OF GAIT: Primary | ICD-10-CM

## 2024-06-28 DIAGNOSIS — Z78.9 DECREASED ACTIVITIES OF DAILY LIVING (ADL): ICD-10-CM

## 2024-06-28 DIAGNOSIS — M54.42 CHRONIC LEFT-SIDED LOW BACK PAIN WITH LEFT-SIDED SCIATICA: ICD-10-CM

## 2024-06-28 NOTE — PROGRESS NOTES
[facility]  [address] [place] [id number]  Dept: [phone]      Physical Therapy Daily Note     Referring MD: Rell Celis APRN*  Diagnosis:     ICD-10-CM    1. Abnormality of gait  R26.9       2. Muscle weakness (generalized)  M62.81       3. Chronic left-sided low back pain with left-sided sciatica  M54.42     G89.29       4. Decreased activities of daily living (ADL)  Z78.9       5. Decreased activity tolerance  R68.89       6. Lumbar radiculopathy [M54.16]  M54.16           Surgery: None    Therapy precautions: Needs   Co-morbidities affecting plan of care: None    Payor: Payor: / No coverage found. Billing pattern: Other - self pay. Hospital Sponsored  Total Timed Procedure Codes: 45 min   Total Time: 45 min   Modifier needed: No    Episode visit count:  4   Medications: reviewed in chart   Diagnostic exams (per chart review): MRI reveals very minimal lateral recess narrowing at L4-5 due to a disc bulge. There is mild facet arthropathy. The L2 lesion is noted.     SUBJECTIVE     Reports pain is not bad today. Pain radiates to the leg but improves with exercises. L side is worse on the R. Grades pain at 6/10.      OBJECTIVE EXAMINATION     Increased tenderness noted on the L paraspinals, glut med tender point.     Treatment provided today consisted of:  Therapeutic exercise (06325) x 40 min to address ROM/strength deficits and to develop an HEP as noted below.    Due to language barrier, an in person  was present during the treatment and subsequent discussion (and for part of the physical exam) with this patient.     Prone Press Up On Elbows  - 5-10 reps - 2 hold  Prone Press Up  - 10 reps x 2   S/L hip abduction with pink T band x 10 B x 2  S/L and supine B clams x 10 x 2  Bridges x 10 x 2  Side-lying Shoulder abduction 10 x 2   Prone hip rotations x 15   Quadruped cat and camel x 10   Bird dogs x 10  Educated on symptom

## 2024-10-29 ENCOUNTER — OFFICE VISIT (OUTPATIENT)
Dept: FAMILY MEDICINE CLINIC | Facility: CLINIC | Age: 64
End: 2024-10-29

## 2024-10-29 VITALS
HEIGHT: 63 IN | BODY MASS INDEX: 28.88 KG/M2 | WEIGHT: 163 LBS | TEMPERATURE: 98.2 F | SYSTOLIC BLOOD PRESSURE: 150 MMHG | DIASTOLIC BLOOD PRESSURE: 94 MMHG | OXYGEN SATURATION: 98 % | HEART RATE: 76 BPM

## 2024-10-29 DIAGNOSIS — Z13.220 SCREENING FOR LIPID DISORDERS: ICD-10-CM

## 2024-10-29 DIAGNOSIS — Z59.71 DOES NOT HAVE HEALTH INSURANCE: ICD-10-CM

## 2024-10-29 DIAGNOSIS — Z59.9 FINANCIAL DIFFICULTIES: ICD-10-CM

## 2024-10-29 DIAGNOSIS — I10 PRIMARY HYPERTENSION: Primary | ICD-10-CM

## 2024-10-29 DIAGNOSIS — Z13.1 SCREENING FOR DIABETES MELLITUS: ICD-10-CM

## 2024-10-29 DIAGNOSIS — E03.9 ACQUIRED HYPOTHYROIDISM: ICD-10-CM

## 2024-10-29 PROBLEM — R60.0 LOCALIZED EDEMA: Status: RESOLVED | Noted: 2022-06-09 | Resolved: 2024-10-29

## 2024-10-29 PROBLEM — Z98.890 HISTORY OF THYROID SURGERY: Status: RESOLVED | Noted: 2022-05-26 | Resolved: 2024-10-29

## 2024-10-29 PROBLEM — R22.1 NECK SWELLING: Status: RESOLVED | Noted: 2022-05-26 | Resolved: 2024-10-29

## 2024-10-29 PROBLEM — E78.00 HYPERCHOLESTEREMIA: Status: RESOLVED | Noted: 2022-06-14 | Resolved: 2024-10-29

## 2024-10-29 PROBLEM — R73.01 ELEVATED FASTING GLUCOSE: Status: RESOLVED | Noted: 2022-06-20 | Resolved: 2024-10-29

## 2024-10-29 LAB
ALBUMIN SERPL-MCNC: 4 G/DL (ref 3.2–4.6)
ALBUMIN/GLOB SERPL: 0.9 (ref 1–1.9)
ALP SERPL-CCNC: 125 U/L (ref 35–104)
ALT SERPL-CCNC: 25 U/L (ref 8–45)
ANION GAP SERPL CALC-SCNC: 12 MMOL/L (ref 7–16)
AST SERPL-CCNC: 25 U/L (ref 15–37)
BASOPHILS # BLD: 0 K/UL (ref 0–0.2)
BASOPHILS NFR BLD: 1 % (ref 0–2)
BILIRUB SERPL-MCNC: 0.3 MG/DL (ref 0–1.2)
BUN SERPL-MCNC: 9 MG/DL (ref 8–23)
CALCIUM SERPL-MCNC: 9.6 MG/DL (ref 8.8–10.2)
CHLORIDE SERPL-SCNC: 104 MMOL/L (ref 98–107)
CHOLEST SERPL-MCNC: 227 MG/DL (ref 0–200)
CO2 SERPL-SCNC: 25 MMOL/L (ref 20–29)
CREAT SERPL-MCNC: 0.71 MG/DL (ref 0.6–1.1)
DIFFERENTIAL METHOD BLD: NORMAL
EOSINOPHIL # BLD: 0.2 K/UL (ref 0–0.8)
EOSINOPHIL NFR BLD: 2 % (ref 0.5–7.8)
ERYTHROCYTE [DISTWIDTH] IN BLOOD BY AUTOMATED COUNT: 13.4 % (ref 11.9–14.6)
EST. AVERAGE GLUCOSE BLD GHB EST-MCNC: 126 MG/DL
GLOBULIN SER CALC-MCNC: 4.3 G/DL (ref 2.3–3.5)
GLUCOSE SERPL-MCNC: 116 MG/DL (ref 70–99)
HBA1C MFR BLD: 6 % (ref 0–5.6)
HCT VFR BLD AUTO: 45.1 % (ref 35.8–46.3)
HDLC SERPL-MCNC: 49 MG/DL (ref 40–60)
HDLC SERPL: 4.6 (ref 0–5)
HGB BLD-MCNC: 14.4 G/DL (ref 11.7–15.4)
IMM GRANULOCYTES # BLD AUTO: 0 K/UL (ref 0–0.5)
IMM GRANULOCYTES NFR BLD AUTO: 0 % (ref 0–5)
LDLC SERPL CALC-MCNC: 149 MG/DL (ref 0–100)
LYMPHOCYTES # BLD: 1.9 K/UL (ref 0.5–4.6)
LYMPHOCYTES NFR BLD: 22 % (ref 13–44)
MCH RBC QN AUTO: 30.2 PG (ref 26.1–32.9)
MCHC RBC AUTO-ENTMCNC: 31.9 G/DL (ref 31.4–35)
MCV RBC AUTO: 94.5 FL (ref 82–102)
MONOCYTES # BLD: 0.6 K/UL (ref 0.1–1.3)
MONOCYTES NFR BLD: 7 % (ref 4–12)
NEUTS SEG # BLD: 5.8 K/UL (ref 1.7–8.2)
NEUTS SEG NFR BLD: 68 % (ref 43–78)
NRBC # BLD: 0 K/UL (ref 0–0.2)
PLATELET # BLD AUTO: 329 K/UL (ref 150–450)
PMV BLD AUTO: 9.6 FL (ref 9.4–12.3)
POTASSIUM SERPL-SCNC: 3.6 MMOL/L (ref 3.5–5.1)
PROT SERPL-MCNC: 8.2 G/DL (ref 6.3–8.2)
RBC # BLD AUTO: 4.77 M/UL (ref 4.05–5.2)
SODIUM SERPL-SCNC: 141 MMOL/L (ref 136–145)
TRIGL SERPL-MCNC: 143 MG/DL (ref 0–150)
TSH W FREE THYROID IF ABNORMAL: 1.11 UIU/ML (ref 0.27–4.2)
VLDLC SERPL CALC-MCNC: 29 MG/DL (ref 6–23)
WBC # BLD AUTO: 8.5 K/UL (ref 4.3–11.1)

## 2024-10-29 PROCEDURE — 3077F SYST BP >= 140 MM HG: CPT

## 2024-10-29 PROCEDURE — 99214 OFFICE O/P EST MOD 30 MIN: CPT

## 2024-10-29 PROCEDURE — 3080F DIAST BP >= 90 MM HG: CPT

## 2024-10-29 RX ORDER — LOSARTAN POTASSIUM 50 MG/1
50 TABLET ORAL DAILY
Qty: 90 TABLET | Refills: 1 | Status: SHIPPED | OUTPATIENT
Start: 2024-10-29

## 2024-10-29 SDOH — ECONOMIC STABILITY - INCOME SECURITY: PROBLEM RELATED TO HOUSING AND ECONOMIC CIRCUMSTANCES, UNSPECIFIED: Z59.9

## 2024-10-29 NOTE — PROGRESS NOTES
Han Garcia (: 1960) is a 63 y.o. female, established patient, here for evaluation of the following chief complaint(s):  Follow-up (Leg and spine pain, goes from leg to foot. Discuss thyroid and blood pressure. )       ASSESSMENT/PLAN:  1. Primary hypertension  -     CBC with Auto Differential; Future  -     Comprehensive Metabolic Panel; Future  -     losartan (COZAAR) 50 MG tablet; Take 1 tablet by mouth daily, Disp-90 tablet, R-1Normal  2. Acquired hypothyroidism  -     TSH with Reflex; Future  3. Screening for diabetes mellitus  -     Hemoglobin A1C; Future  4. Screening for lipid disorders  -     Lipid Panel; Future  5. Financial difficulties  -     [facility] - Care Coordination/Social Work - [facility] Care Management  6. Does not have health insurance  -     [facility] - Care Coordination/Social Work - [facility] Care Management      SUBJECTIVE/OBJECTIVE:  HPI    6 month chronic F/U:     Last seen on 24. We addressed chronic lower back pain. Placed MRI and Ortho consult.     MRI found 15 mm T1 hypointense lesion, 9 mm T1/T2 hyperintense lesion.     She was seen by Ortho who discussed MRI findings, recommend formal PT, F/U nuclear medicine bone scan for L2 lesion.     At today's visit:     Hypertension - Not well controlled, takes medications as prescribed.  denies chest pain, shortness of breath, abdominal pain, nausea, vomiting, diarrhea, dizziness or fainting, headaches or vision changes.     Hypertension Medications       Angiotensin II Receptor Antagonists       losartan (COZAAR) 50 MG tablet Take 1 tablet by mouth daily           She is not taking her BP at home. We discussed the importance of this regarding medication adjustments. Given BP log with goal. Asked that she purchase an upper arm cuff, we discussed how to properly take BP at home. Increasing Losartan to 50 mg daily. F/U in 4 weeks.     Hypothyroidism: she was seeing Endocrinology however has not seen in over one year. Hx of  Helical Rim Advancement Flap Text: The defect edges were debeveled with a #15 blade scalpel.  Given the location of the defect and the proximity to free margins (helical rim) a double helical rim advancement flap was deemed most appropriate.  Using a sterile surgical marker, the appropriate advancement flaps were drawn incorporating the defect and placing the expected incisions between the helical rim and antihelix where possible.  The area thus outlined was incised through and through with a #15 scalpel blade.  With a skin hook and iris scissors, the flaps were gently and sharply undermined and freed up.

## 2024-10-30 NOTE — RESULT ENCOUNTER NOTE
Please call patient's daughter or mail letter:     Her cholesterol remains elevated. Her 10 year risk score for having a heart attack or stroke is at 8% which is a moderate risk. I would recommend we start a statin medication called Rosuvastatin to help lower this. It would be 5 mg nightly. Please let me know if she would like to start.     Her A1C is at 6.0 which is prediabetes. She needs to watch carbohydrate intake such as white breads, rices, pastas, and sweets. Recommend walking for 30 minutes for 3-4 days weekly.     All other labs are OK.

## 2025-01-20 DIAGNOSIS — E03.9 PRIMARY HYPOTHYROIDISM: ICD-10-CM

## 2025-01-21 RX ORDER — LEVOTHYROXINE SODIUM 100 UG/1
TABLET ORAL
Qty: 90 TABLET | Refills: 0 | OUTPATIENT
Start: 2025-01-21

## 2025-01-22 DIAGNOSIS — E03.9 PRIMARY HYPOTHYROIDISM: ICD-10-CM

## 2025-01-22 RX ORDER — LEVOTHYROXINE SODIUM 100 UG/1
TABLET ORAL
Qty: 90 TABLET | Refills: 3 | Status: SHIPPED | OUTPATIENT
Start: 2025-01-22

## 2025-01-26 SDOH — ECONOMIC STABILITY: FOOD INSECURITY: WITHIN THE PAST 12 MONTHS, THE FOOD YOU BOUGHT JUST DIDN'T LAST AND YOU DIDN'T HAVE MONEY TO GET MORE.: NEVER TRUE

## 2025-01-26 SDOH — ECONOMIC STABILITY: FOOD INSECURITY: WITHIN THE PAST 12 MONTHS, YOU WORRIED THAT YOUR FOOD WOULD RUN OUT BEFORE YOU GOT MONEY TO BUY MORE.: NEVER TRUE

## 2025-01-26 SDOH — ECONOMIC STABILITY: INCOME INSECURITY: IN THE LAST 12 MONTHS, WAS THERE A TIME WHEN YOU WERE NOT ABLE TO PAY THE MORTGAGE OR RENT ON TIME?: NO

## 2025-01-26 SDOH — ECONOMIC STABILITY: TRANSPORTATION INSECURITY
IN THE PAST 12 MONTHS, HAS THE LACK OF TRANSPORTATION KEPT YOU FROM MEDICAL APPOINTMENTS OR FROM GETTING MEDICATIONS?: NO

## 2025-01-26 SDOH — ECONOMIC STABILITY: TRANSPORTATION INSECURITY
IN THE PAST 12 MONTHS, HAS LACK OF TRANSPORTATION KEPT YOU FROM MEETINGS, WORK, OR FROM GETTING THINGS NEEDED FOR DAILY LIVING?: NO

## 2025-01-26 ASSESSMENT — PATIENT HEALTH QUESTIONNAIRE - PHQ9
SUM OF ALL RESPONSES TO PHQ QUESTIONS 1-9: 2
SUM OF ALL RESPONSES TO PHQ QUESTIONS 1-9: 2
2. FEELING DOWN, DEPRESSED OR HOPELESS: MORE THAN HALF THE DAYS
SUM OF ALL RESPONSES TO PHQ9 QUESTIONS 1 & 2: 2
1. LITTLE INTEREST OR PLEASURE IN DOING THINGS: NOT AT ALL
SUM OF ALL RESPONSES TO PHQ QUESTIONS 1-9: 2
2. FEELING DOWN, DEPRESSED OR HOPELESS: MORE THAN HALF THE DAYS
SUM OF ALL RESPONSES TO PHQ QUESTIONS 1-9: 2
SUM OF ALL RESPONSES TO PHQ9 QUESTIONS 1 & 2: 2
1. LITTLE INTEREST OR PLEASURE IN DOING THINGS: NOT AT ALL

## 2025-01-27 ENCOUNTER — OFFICE VISIT (OUTPATIENT)
Dept: FAMILY MEDICINE CLINIC | Facility: CLINIC | Age: 65
End: 2025-01-27
Payer: COMMERCIAL

## 2025-01-27 VITALS
BODY MASS INDEX: 29.06 KG/M2 | TEMPERATURE: 97.7 F | HEART RATE: 69 BPM | OXYGEN SATURATION: 99 % | WEIGHT: 164 LBS | SYSTOLIC BLOOD PRESSURE: 138 MMHG | DIASTOLIC BLOOD PRESSURE: 78 MMHG | HEIGHT: 63 IN

## 2025-01-27 DIAGNOSIS — I10 PRIMARY HYPERTENSION: Primary | ICD-10-CM

## 2025-01-27 DIAGNOSIS — E78.2 MIXED HYPERLIPIDEMIA: ICD-10-CM

## 2025-01-27 DIAGNOSIS — E03.9 ACQUIRED HYPOTHYROIDISM: ICD-10-CM

## 2025-01-27 DIAGNOSIS — R73.03 PREDIABETES: ICD-10-CM

## 2025-01-27 PROBLEM — Z59.71 DOES NOT HAVE HEALTH INSURANCE: Status: RESOLVED | Noted: 2024-10-29 | Resolved: 2025-01-27

## 2025-01-27 PROCEDURE — 3075F SYST BP GE 130 - 139MM HG: CPT

## 2025-01-27 PROCEDURE — 99214 OFFICE O/P EST MOD 30 MIN: CPT

## 2025-01-27 PROCEDURE — 3078F DIAST BP <80 MM HG: CPT

## 2025-01-27 RX ORDER — ROSUVASTATIN CALCIUM 5 MG/1
5 TABLET, COATED ORAL NIGHTLY
Qty: 90 TABLET | Refills: 1 | Status: SHIPPED | OUTPATIENT
Start: 2025-01-27

## 2025-01-27 NOTE — PROGRESS NOTES
Han Garcia (: 1960) is a 64 y.o. female, established patient, here for evaluation of the following chief complaint(s):  Follow-up (Would like thyroid labs. Discuss previous labs. Check BP. Still having lower back pain. Upper back pain/ Itchiness )       ASSESSMENT/PLAN:  1. Mixed hyperlipidemia  -     rosuvastatin (CRESTOR) 5 MG tablet; Take 1 tablet by mouth at bedtime, Disp-90 tablet, R-1Normal  -     Lipid Panel; Future  2. Primary hypertension  -     Comprehensive Metabolic Panel; Future  3. Acquired hypothyroidism  -     TSH reflex to FT4; Future  4. Prediabetes  -     Comprehensive Metabolic Panel; Future  -     Hemoglobin A1C; Future      SUBJECTIVE/OBJECTIVE:  HPI    3 month F/U:     Hypertension - stable, well controlled, takes medications as prescribed.  denies chest pain, shortness of breath, abdominal pain, nausea, vomiting, diarrhea, dizziness or fainting, headaches or +vision changes.     Hypertension Medications       Angiotensin II Receptor Antagonists       losartan (COZAAR) 50 MG tablet Take 1 tablet by mouth daily           Monitor BP at home: 130/83    Relays she went to [facility] and had an eye exam completed where she received glasses. She will still experience intermittent blurred vision. We discussed possible need for an eye doctor.     Hyperlipidemia - Not well controlled, denies any chest pain, shortness of breath, dizziness, headaches or +vision changes.     We discussed her lab results from 3 months ago. Starting Rosuvastatin 5 mg nightly.     Lipid Lowering Medications       HMG CoA Reductase Inhibitors       rosuvastatin (CRESTOR) 5 MG tablet Take 1 tablet by mouth at bedtime          Prediabetes: discussed lab results; encouraged decreased carbohydrate intake, routine physical activity.     We discussed the need to complete the bone scan that was ordered last year. I provided radiology's number on her paperwork so she can call to get this completed. I

## 2025-01-27 NOTE — PATIENT INSTRUCTIONS
Call  to schedule radiology tests     Please call the number listed above to schedule the image that Orthopedics placed. It is important to follow up with them after you have it completed.

## 2025-02-17 ENCOUNTER — HOSPITAL ENCOUNTER (OUTPATIENT)
Dept: NUCLEAR MEDICINE | Age: 65
Discharge: HOME | End: 2025-02-20
Payer: COMMERCIAL

## 2025-02-17 DIAGNOSIS — M89.9 LESION OF VERTEBRA: ICD-10-CM

## 2025-02-17 DIAGNOSIS — M54.16 LUMBAR RADICULOPATHY: ICD-10-CM

## 2025-02-17 DIAGNOSIS — M51.369 LUMBAR DEGENERATIVE DISC DISEASE: ICD-10-CM

## 2025-02-17 PROCEDURE — A9503 TC99M MEDRONATE: HCPCS | Performed by: NURSE PRACTITIONER

## 2025-02-17 PROCEDURE — 78306 BONE IMAGING WHOLE BODY: CPT | Performed by: NURSE PRACTITIONER

## 2025-02-17 PROCEDURE — 3430000000 HC RX DIAGNOSTIC RADIOPHARMACEUTICAL: Performed by: NURSE PRACTITIONER

## 2025-02-17 RX ORDER — TC 99M MEDRONATE 20 MG/10ML
25.6 INJECTION, POWDER, LYOPHILIZED, FOR SOLUTION INTRAVENOUS
Status: COMPLETED | OUTPATIENT
Start: 2025-02-17 | End: 2025-02-17

## 2025-02-17 RX ADMIN — TC 99M MEDRONATE 25.6 MILLICURIE: 20 INJECTION, POWDER, LYOPHILIZED, FOR SOLUTION INTRAVENOUS at 09:20

## 2025-04-08 ENCOUNTER — RESULTS FOLLOW-UP (OUTPATIENT)
Dept: ORTHOPEDIC SURGERY | Age: 65
End: 2025-04-08

## 2025-04-21 ENCOUNTER — LAB (OUTPATIENT)
Dept: FAMILY MEDICINE CLINIC | Facility: CLINIC | Age: 65
End: 2025-04-21

## 2025-04-21 DIAGNOSIS — I10 PRIMARY HYPERTENSION: ICD-10-CM

## 2025-04-21 DIAGNOSIS — R73.03 PREDIABETES: ICD-10-CM

## 2025-04-21 DIAGNOSIS — E78.2 MIXED HYPERLIPIDEMIA: ICD-10-CM

## 2025-04-21 DIAGNOSIS — E03.9 ACQUIRED HYPOTHYROIDISM: ICD-10-CM

## 2025-04-21 LAB
ALBUMIN SERPL-MCNC: 3.5 G/DL (ref 3.2–4.6)
ALBUMIN/GLOB SERPL: 0.9 (ref 1–1.9)
ALP SERPL-CCNC: 117 U/L (ref 35–104)
ALT SERPL-CCNC: 23 U/L (ref 8–45)
ANION GAP SERPL CALC-SCNC: 13 MMOL/L (ref 7–16)
AST SERPL-CCNC: 20 U/L (ref 15–37)
BILIRUB SERPL-MCNC: 0.3 MG/DL (ref 0–1.2)
BUN SERPL-MCNC: 14 MG/DL (ref 8–23)
CALCIUM SERPL-MCNC: 9.1 MG/DL (ref 8.8–10.2)
CHLORIDE SERPL-SCNC: 104 MMOL/L (ref 98–107)
CHOLEST SERPL-MCNC: 237 MG/DL (ref 0–200)
CO2 SERPL-SCNC: 25 MMOL/L (ref 20–29)
CREAT SERPL-MCNC: 0.73 MG/DL (ref 0.6–1.1)
EST. AVERAGE GLUCOSE BLD GHB EST-MCNC: 130 MG/DL
GLOBULIN SER CALC-MCNC: 3.8 G/DL (ref 2.3–3.5)
GLUCOSE SERPL-MCNC: 116 MG/DL (ref 70–99)
HBA1C MFR BLD: 6.2 % (ref 0–5.6)
HDLC SERPL-MCNC: 48 MG/DL (ref 40–60)
HDLC SERPL: 4.9 (ref 0–5)
LDLC SERPL CALC-MCNC: 165 MG/DL (ref 0–100)
POTASSIUM SERPL-SCNC: 3.9 MMOL/L (ref 3.5–5.1)
PROT SERPL-MCNC: 7.3 G/DL (ref 6.3–8.2)
SODIUM SERPL-SCNC: 141 MMOL/L (ref 136–145)
TRIGL SERPL-MCNC: 120 MG/DL (ref 0–150)
TSH W FREE THYROID IF ABNORMAL: 1.14 UIU/ML (ref 0.27–4.2)
VLDLC SERPL CALC-MCNC: 24 MG/DL (ref 6–23)

## 2025-04-22 ENCOUNTER — RESULTS FOLLOW-UP (OUTPATIENT)
Dept: FAMILY MEDICINE CLINIC | Facility: CLINIC | Age: 65
End: 2025-04-22

## 2025-04-25 NOTE — PROGRESS NOTES
tablet Take 1 tablet by mouth at bedtime           She was not aware that Rosuvastatin had been sent for her to start. Will resend today and advised to take nightly.       Prediabetes: A1C increased to 6.2, will start Metformin  mg daily. We discussed dietary measures to improve values.     Reviewed her lab results today.     She had questions regarding her bone scan ordered by Orthopedics. We discussed her results. She continues with lower back pain after completing PT. Advised to schedule an appt with Orthopedics for further F/U.     She endorses bilateral blurred vision. Will refer to Ophthalmology today.       Preventative:  --Colonoscopy: ordering today  --Tdap: She will look in her records as she received multiple vaccines when arriving to the [place].     Vitals:    04/28/25 0821 04/28/25 0905   BP: (!) 154/90 (!) 156/78   Pulse: 67    Temp: 98.2 °F (36.8 °C)    SpO2: 98%    Weight: 75.1 kg (165 lb 8 oz)    Height: 1.588 m (5' 2.5\")       Physical Exam  Cardiovascular:      Rate and Rhythm: Normal rate and regular rhythm.      Heart sounds: Normal heart sounds.   Pulmonary:      Effort: Pulmonary effort is normal.      Breath sounds: Normal breath sounds.   Skin:     General: Skin is warm and dry.   Neurological:      Mental Status: She is alert and oriented to person, place, and time.         An electronic signature was used to authenticate this note.  -- KARLI Perdomo - CNP

## 2025-04-28 ENCOUNTER — OFFICE VISIT (OUTPATIENT)
Dept: FAMILY MEDICINE CLINIC | Facility: CLINIC | Age: 65
End: 2025-04-28
Payer: COMMERCIAL

## 2025-04-28 VITALS
HEIGHT: 63 IN | DIASTOLIC BLOOD PRESSURE: 78 MMHG | TEMPERATURE: 98.2 F | SYSTOLIC BLOOD PRESSURE: 156 MMHG | OXYGEN SATURATION: 98 % | BODY MASS INDEX: 29.32 KG/M2 | WEIGHT: 165.5 LBS | HEART RATE: 67 BPM

## 2025-04-28 DIAGNOSIS — I10 PRIMARY HYPERTENSION: Primary | ICD-10-CM

## 2025-04-28 DIAGNOSIS — E78.2 MIXED HYPERLIPIDEMIA: ICD-10-CM

## 2025-04-28 DIAGNOSIS — H53.8 BLURRED VISION: ICD-10-CM

## 2025-04-28 DIAGNOSIS — Z12.11 SCREENING FOR COLON CANCER: ICD-10-CM

## 2025-04-28 DIAGNOSIS — R73.03 PREDIABETES: ICD-10-CM

## 2025-04-28 PROCEDURE — 3078F DIAST BP <80 MM HG: CPT

## 2025-04-28 PROCEDURE — 3077F SYST BP >= 140 MM HG: CPT

## 2025-04-28 PROCEDURE — 99214 OFFICE O/P EST MOD 30 MIN: CPT

## 2025-04-28 RX ORDER — ROSUVASTATIN CALCIUM 5 MG/1
5 TABLET, COATED ORAL NIGHTLY
Qty: 90 TABLET | Refills: 1 | Status: SHIPPED | OUTPATIENT
Start: 2025-04-28

## 2025-04-28 RX ORDER — LOSARTAN POTASSIUM 50 MG/1
50 TABLET ORAL DAILY
Qty: 90 TABLET | Refills: 1 | Status: SHIPPED | OUTPATIENT
Start: 2025-04-28

## 2025-04-28 RX ORDER — METFORMIN HYDROCHLORIDE 500 MG/1
500 TABLET, EXTENDED RELEASE ORAL
Qty: 90 TABLET | Refills: 1 | Status: SHIPPED | OUTPATIENT
Start: 2025-04-28

## 2025-07-24 ENCOUNTER — LAB (OUTPATIENT)
Dept: FAMILY MEDICINE CLINIC | Facility: CLINIC | Age: 65
End: 2025-07-24

## 2025-07-24 DIAGNOSIS — E78.2 MIXED HYPERLIPIDEMIA: ICD-10-CM

## 2025-07-24 DIAGNOSIS — I10 PRIMARY HYPERTENSION: ICD-10-CM

## 2025-07-24 DIAGNOSIS — R73.03 PREDIABETES: ICD-10-CM

## 2025-07-24 LAB
ALBUMIN SERPL-MCNC: 3.9 G/DL (ref 3.2–4.6)
ALBUMIN/GLOB SERPL: 1.1 (ref 1–1.9)
ALP SERPL-CCNC: 110 U/L (ref 35–104)
ALT SERPL-CCNC: 29 U/L (ref 8–45)
ANION GAP SERPL CALC-SCNC: 12 MMOL/L (ref 7–16)
AST SERPL-CCNC: 22 U/L (ref 15–37)
BILIRUB SERPL-MCNC: 0.5 MG/DL (ref 0–1.2)
BUN SERPL-MCNC: 11 MG/DL (ref 8–23)
CALCIUM SERPL-MCNC: 9.6 MG/DL (ref 8.8–10.2)
CHLORIDE SERPL-SCNC: 102 MMOL/L (ref 98–107)
CHOLEST SERPL-MCNC: 240 MG/DL (ref 0–200)
CO2 SERPL-SCNC: 25 MMOL/L (ref 20–29)
CREAT SERPL-MCNC: 0.72 MG/DL (ref 0.6–1.1)
EST. AVERAGE GLUCOSE BLD GHB EST-MCNC: 126 MG/DL
GLOBULIN SER CALC-MCNC: 3.6 G/DL (ref 2.3–3.5)
GLUCOSE SERPL-MCNC: 102 MG/DL (ref 70–99)
HBA1C MFR BLD: 6 % (ref 0–5.6)
HDLC SERPL-MCNC: 54 MG/DL (ref 40–60)
HDLC SERPL: 4.5 (ref 0–5)
LDLC SERPL CALC-MCNC: 168 MG/DL (ref 0–100)
POTASSIUM SERPL-SCNC: 4.1 MMOL/L (ref 3.5–5.1)
PROT SERPL-MCNC: 7.5 G/DL (ref 6.3–8.2)
SODIUM SERPL-SCNC: 139 MMOL/L (ref 136–145)
TRIGL SERPL-MCNC: 92 MG/DL (ref 0–150)
VLDLC SERPL CALC-MCNC: 18 MG/DL (ref 6–23)

## 2025-07-29 ENCOUNTER — OFFICE VISIT (OUTPATIENT)
Dept: FAMILY MEDICINE CLINIC | Facility: CLINIC | Age: 65
End: 2025-07-29
Payer: COMMERCIAL

## 2025-07-29 VITALS
BODY MASS INDEX: 29.3 KG/M2 | SYSTOLIC BLOOD PRESSURE: 140 MMHG | TEMPERATURE: 98.2 F | HEART RATE: 72 BPM | OXYGEN SATURATION: 99 % | WEIGHT: 165.38 LBS | DIASTOLIC BLOOD PRESSURE: 78 MMHG | HEIGHT: 63 IN

## 2025-07-29 DIAGNOSIS — I10 PRIMARY HYPERTENSION: Primary | ICD-10-CM

## 2025-07-29 DIAGNOSIS — H53.8 VISUAL BLURRINESS: ICD-10-CM

## 2025-07-29 DIAGNOSIS — R73.03 PREDIABETES: ICD-10-CM

## 2025-07-29 DIAGNOSIS — E78.2 MIXED HYPERLIPIDEMIA: ICD-10-CM

## 2025-07-29 DIAGNOSIS — M54.16 LUMBAR RADICULOPATHY: ICD-10-CM

## 2025-07-29 DIAGNOSIS — Z12.11 SCREENING FOR COLON CANCER: ICD-10-CM

## 2025-07-29 PROBLEM — R26.9 ABNORMALITY OF GAIT: Status: RESOLVED | Noted: 2024-06-06 | Resolved: 2025-07-29

## 2025-07-29 PROBLEM — Z86.39 HISTORY OF HYPERCHOLESTEROLEMIA: Status: RESOLVED | Noted: 2022-06-09 | Resolved: 2025-07-29

## 2025-07-29 PROBLEM — R68.89 DECREASED ACTIVITY TOLERANCE: Status: RESOLVED | Noted: 2024-06-06 | Resolved: 2025-07-29

## 2025-07-29 PROCEDURE — 99214 OFFICE O/P EST MOD 30 MIN: CPT

## 2025-07-29 PROCEDURE — 3078F DIAST BP <80 MM HG: CPT

## 2025-07-29 PROCEDURE — 3077F SYST BP >= 140 MM HG: CPT

## 2025-07-29 RX ORDER — EZETIMIBE 10 MG/1
10 TABLET ORAL DAILY
Qty: 90 TABLET | Refills: 0 | Status: SHIPPED | OUTPATIENT
Start: 2025-07-29

## 2025-07-29 RX ORDER — CYCLOBENZAPRINE HCL 10 MG
10 TABLET ORAL NIGHTLY PRN
Qty: 30 TABLET | Refills: 0 | Status: SHIPPED | OUTPATIENT
Start: 2025-07-29 | End: 2025-08-28
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
## 2025-07-29 NOTE — PATIENT INSTRUCTIONS
[facility] (P: [phone] F: [phone])     [facility]: Phone: [phone]

## 2025-07-29 NOTE — CONSULTS
Session ID: [id number]  Session Duration: Longer than 52 minutes  Language: Icelandic   ID: #[id number]   Name: Brook

## 2025-08-05 ENCOUNTER — COMMUNITY OUTREACH (OUTPATIENT)
Dept: FAMILY MEDICINE CLINIC | Facility: CLINIC | Age: 65
End: 2025-08-05

## 2025-08-19 LAB — NONINV COLON CA DNA+OCC BLD SCRN STL QL: NEGATIVE
